# Patient Record
Sex: FEMALE | Race: WHITE | Employment: UNEMPLOYED | URBAN - METROPOLITAN AREA
[De-identification: names, ages, dates, MRNs, and addresses within clinical notes are randomized per-mention and may not be internally consistent; named-entity substitution may affect disease eponyms.]

---

## 2017-03-03 ENCOUNTER — TELEPHONE (OUTPATIENT)
Dept: FAMILY MEDICINE CLINIC | Age: 67
End: 2017-03-03

## 2017-03-08 ENCOUNTER — NURSE ONLY (OUTPATIENT)
Dept: FAMILY MEDICINE CLINIC | Age: 67
End: 2017-03-08

## 2017-03-08 ENCOUNTER — TELEPHONE (OUTPATIENT)
Dept: FAMILY MEDICINE CLINIC | Age: 67
End: 2017-03-08

## 2017-03-08 VITALS — DIASTOLIC BLOOD PRESSURE: 84 MMHG | SYSTOLIC BLOOD PRESSURE: 138 MMHG

## 2017-03-08 VITALS — SYSTOLIC BLOOD PRESSURE: 138 MMHG | DIASTOLIC BLOOD PRESSURE: 84 MMHG

## 2017-03-08 DIAGNOSIS — Z01.30 BLOOD PRESSURE CHECK: Primary | ICD-10-CM

## 2017-03-10 ENCOUNTER — OFFICE VISIT (OUTPATIENT)
Dept: FAMILY MEDICINE CLINIC | Age: 67
End: 2017-03-10

## 2017-03-10 VITALS
WEIGHT: 140.8 LBS | HEART RATE: 58 BPM | BODY MASS INDEX: 25.34 KG/M2 | TEMPERATURE: 98.6 F | DIASTOLIC BLOOD PRESSURE: 78 MMHG | OXYGEN SATURATION: 98 % | SYSTOLIC BLOOD PRESSURE: 122 MMHG

## 2017-03-10 DIAGNOSIS — R41.840 CONCENTRATION DEFICIT: ICD-10-CM

## 2017-03-10 DIAGNOSIS — R42 DISEQUILIBRIUM: Primary | ICD-10-CM

## 2017-03-10 DIAGNOSIS — M79.672 PAIN IN BOTH FEET: ICD-10-CM

## 2017-03-10 DIAGNOSIS — M79.671 PAIN IN BOTH FEET: ICD-10-CM

## 2017-03-10 PROCEDURE — 99214 OFFICE O/P EST MOD 30 MIN: CPT | Performed by: FAMILY MEDICINE

## 2017-03-10 PROCEDURE — 36415 COLL VENOUS BLD VENIPUNCTURE: CPT | Performed by: FAMILY MEDICINE

## 2017-03-11 LAB
A/G RATIO: 2.1 (CALC) (ref 0.8–2.6)
ALBUMIN SERPL-MCNC: 4.7 GM/DL (ref 3.5–5.2)
ALP BLD-CCNC: 116 U/L (ref 23–144)
ALT SERPL-CCNC: 10 U/L (ref 0–60)
AST SERPL-CCNC: 15 U/L (ref 0–46)
BASOPHILS ABSOLUTE: 0 K/MM3 (ref 0–0.3)
BASOPHILS RELATIVE PERCENT: 0.7 % (ref 0–2)
BILIRUB SERPL-MCNC: 0.5 MG/DL (ref 0–1.2)
BUN / CREAT RATIO: 24 (CALC) (ref 7–25)
BUN BLDV-MCNC: 17 MG/DL (ref 3–29)
CALCIUM SERPL-MCNC: 9.3 MG/DL (ref 8.5–10.5)
CHLORIDE BLD-SCNC: 100 MEQ/L (ref 96–110)
CO2: 26 MEQ/L (ref 19–32)
COPY(IES) SENT TO:: NORMAL
CREAT SERPL-MCNC: 0.7 MG/DL
EOSINOPHILS ABSOLUTE: 0 K/MM3 (ref 0–0.6)
EOSINOPHILS RELATIVE PERCENT: 0.7 % (ref 0–7)
GFR SERPL CREATININE-BSD FRML MDRD: 92 ML/MIN/1.73M2
GLOBULIN: 2.2 GM/DL (CALC) (ref 1.9–3.6)
GLUCOSE BLD-MCNC: 95 MG/DL
HCT VFR BLD CALC: 41.9 % (ref 35–46)
HEMOGLOBIN: 14 G/DL (ref 12–15.6)
LEUKOCYTES, UA: 6.5 K/MM3 (ref 3.8–10.8)
LYMPHOCYTES ABSOLUTE: 1.3 K/MM3 (ref 0.9–4.1)
LYMPHOCYTES RELATIVE PERCENT: 19.5 % (ref 18–47)
MCH RBC QN AUTO: 30.6 PG (ref 27–33)
MCHC RBC AUTO-ENTMCNC: 33.3 G/DL (ref 32–36)
MCV RBC AUTO: 91.8 FL (ref 80–100)
MONOCYTES ABSOLUTE: 0.4 K/MM3 (ref 0.2–1.1)
MONOCYTES RELATIVE PERCENT: 5.7 % (ref 0–14)
NEUTROPHILS ABSOLUTE: 4.8 K/MM3 (ref 1.5–7.8)
PDW BLD-RTO: 12.9 % (ref 9–15)
PLATELET # BLD: 268 K/MM3 (ref 130–400)
POTASSIUM SERPL-SCNC: 4 MEQ/L (ref 3.4–5.3)
RBC # BLD: 4.56 M/MM3 (ref 3.9–5.2)
SEGMENTED NEUTROPHILS RELATIVE PERCENT: 73.4 % (ref 40–75)
SODIUM BLD-SCNC: 141 MEQ/L (ref 135–148)
TOTAL PROTEIN: 6.9 GM/DL (ref 6–8.3)
TSH SERPL DL<=0.05 MIU/L-ACNC: 1.88 MICRO IU/ML (ref 0.4–4)

## 2017-05-10 ENCOUNTER — TELEPHONE (OUTPATIENT)
Dept: FAMILY MEDICINE CLINIC | Age: 67
End: 2017-05-10

## 2017-05-10 DIAGNOSIS — Z12.31 ENCOUNTER FOR SCREENING MAMMOGRAM FOR BREAST CANCER: Primary | ICD-10-CM

## 2017-05-18 ENCOUNTER — HOSPITAL ENCOUNTER (OUTPATIENT)
Dept: WOMENS IMAGING | Age: 67
Discharge: HOME | End: 2017-05-18
Attending: FAMILY MEDICINE | Admitting: FAMILY MEDICINE

## 2017-05-18 DIAGNOSIS — Z12.31 SCREENING MAMMOGRAM, ENCOUNTER FOR: ICD-10-CM

## 2017-05-23 DIAGNOSIS — R07.89 CHEST WALL PAIN: ICD-10-CM

## 2017-05-23 DIAGNOSIS — Z12.31 ENCOUNTER FOR SCREENING MAMMOGRAM FOR BREAST CANCER: ICD-10-CM

## 2017-11-30 ENCOUNTER — TELEPHONE (OUTPATIENT)
Dept: FAMILY MEDICINE CLINIC | Age: 67
End: 2017-11-30

## 2017-11-30 ENCOUNTER — NURSE ONLY (OUTPATIENT)
Dept: FAMILY MEDICINE CLINIC | Age: 67
End: 2017-11-30

## 2017-11-30 DIAGNOSIS — Z23 ENCOUNTER FOR IMMUNIZATION: Primary | ICD-10-CM

## 2017-11-30 PROCEDURE — G0009 ADMIN PNEUMOCOCCAL VACCINE: HCPCS | Performed by: FAMILY MEDICINE

## 2017-11-30 PROCEDURE — 90670 PCV13 VACCINE IM: CPT | Performed by: FAMILY MEDICINE

## 2017-12-15 ENCOUNTER — TELEPHONE (OUTPATIENT)
Dept: FAMILY MEDICINE CLINIC | Age: 67
End: 2017-12-15

## 2017-12-15 NOTE — TELEPHONE ENCOUNTER
Per Dr. Jazzmine Gomez' verbal order, [address] [facility] ED called to inform them that the patient was on her way to the ED with changed mental status and chest pain.

## 2018-01-09 ENCOUNTER — OFFICE VISIT (OUTPATIENT)
Dept: FAMILY MEDICINE CLINIC | Age: 68
End: 2018-01-09

## 2018-01-09 VITALS
DIASTOLIC BLOOD PRESSURE: 80 MMHG | OXYGEN SATURATION: 97 % | TEMPERATURE: 99.2 F | BODY MASS INDEX: 24.06 KG/M2 | HEIGHT: 63 IN | HEART RATE: 82 BPM | SYSTOLIC BLOOD PRESSURE: 140 MMHG | WEIGHT: 135.8 LBS

## 2018-01-09 DIAGNOSIS — Z11.59 ENCOUNTER FOR HEPATITIS C SCREENING TEST FOR LOW RISK PATIENT: ICD-10-CM

## 2018-01-09 DIAGNOSIS — F03.90 DEMENTIA WITHOUT BEHAVIORAL DISTURBANCE, UNSPECIFIED DEMENTIA TYPE: ICD-10-CM

## 2018-01-09 DIAGNOSIS — Z11.4 SCREENING FOR HIV (HUMAN IMMUNODEFICIENCY VIRUS): ICD-10-CM

## 2018-01-09 DIAGNOSIS — R07.89 CHEST PAIN, MUSCULOSKELETAL: ICD-10-CM

## 2018-01-09 DIAGNOSIS — J01.00 ACUTE NON-RECURRENT MAXILLARY SINUSITIS: Primary | ICD-10-CM

## 2018-01-09 PROCEDURE — 3017F COLORECTAL CA SCREEN DOC REV: CPT | Performed by: FAMILY MEDICINE

## 2018-01-09 PROCEDURE — G8484 FLU IMMUNIZE NO ADMIN: HCPCS | Performed by: FAMILY MEDICINE

## 2018-01-09 PROCEDURE — 4040F PNEUMOC VAC/ADMIN/RCVD: CPT | Performed by: FAMILY MEDICINE

## 2018-01-09 PROCEDURE — 99214 OFFICE O/P EST MOD 30 MIN: CPT | Performed by: FAMILY MEDICINE

## 2018-01-09 PROCEDURE — 1090F PRES/ABSN URINE INCON ASSESS: CPT | Performed by: FAMILY MEDICINE

## 2018-01-09 PROCEDURE — G8420 CALC BMI NORM PARAMETERS: HCPCS | Performed by: FAMILY MEDICINE

## 2018-01-09 PROCEDURE — 1036F TOBACCO NON-USER: CPT | Performed by: FAMILY MEDICINE

## 2018-01-09 PROCEDURE — G8427 DOCREV CUR MEDS BY ELIG CLIN: HCPCS | Performed by: FAMILY MEDICINE

## 2018-01-09 PROCEDURE — G8400 PT W/DXA NO RESULTS DOC: HCPCS | Performed by: FAMILY MEDICINE

## 2018-01-09 PROCEDURE — 3014F SCREEN MAMMO DOC REV: CPT | Performed by: FAMILY MEDICINE

## 2018-01-09 PROCEDURE — 1123F ACP DISCUSS/DSCN MKR DOCD: CPT | Performed by: FAMILY MEDICINE

## 2018-01-09 RX ORDER — DONEPEZIL HYDROCHLORIDE 10 MG/1
10 TABLET, FILM COATED ORAL NIGHTLY
Qty: 30 TABLET | Refills: 5 | Status: SHIPPED | OUTPATIENT
Start: 2018-01-09 | End: 2018-09-25 | Stop reason: SDUPTHER

## 2018-01-09 RX ORDER — CIPROFLOXACIN 250 MG/1
250 TABLET, FILM COATED ORAL 2 TIMES DAILY
Qty: 20 TABLET | Refills: 0 | Status: SHIPPED | OUTPATIENT
Start: 2018-01-09 | End: 2018-01-19

## 2018-01-09 ASSESSMENT — PATIENT HEALTH QUESTIONNAIRE - PHQ9
1. LITTLE INTEREST OR PLEASURE IN DOING THINGS: 0
2. FEELING DOWN, DEPRESSED OR HOPELESS: 0
SUM OF ALL RESPONSES TO PHQ QUESTIONS 1-9: 0
SUM OF ALL RESPONSES TO PHQ9 QUESTIONS 1 & 2: 0

## 2018-01-10 LAB
COPY(IES) SENT TO:: NORMAL
HEPATITIS C ANTIBODY: NEGATIVE
HIV AG/AB: NORMAL

## 2018-02-08 ENCOUNTER — OFFICE VISIT (OUTPATIENT)
Dept: FAMILY MEDICINE CLINIC | Age: 68
End: 2018-02-08

## 2018-02-08 VITALS
DIASTOLIC BLOOD PRESSURE: 70 MMHG | RESPIRATION RATE: 14 BRPM | TEMPERATURE: 99.3 F | HEIGHT: 62 IN | SYSTOLIC BLOOD PRESSURE: 110 MMHG | HEART RATE: 71 BPM | WEIGHT: 131 LBS | BODY MASS INDEX: 24.11 KG/M2 | OXYGEN SATURATION: 97 %

## 2018-02-08 DIAGNOSIS — S76.312A HAMSTRING MUSCLE STRAIN, LEFT, INITIAL ENCOUNTER: Primary | ICD-10-CM

## 2018-02-08 PROCEDURE — 3014F SCREEN MAMMO DOC REV: CPT | Performed by: FAMILY MEDICINE

## 2018-02-08 PROCEDURE — G8400 PT W/DXA NO RESULTS DOC: HCPCS | Performed by: FAMILY MEDICINE

## 2018-02-08 PROCEDURE — 4040F PNEUMOC VAC/ADMIN/RCVD: CPT | Performed by: FAMILY MEDICINE

## 2018-02-08 PROCEDURE — 1090F PRES/ABSN URINE INCON ASSESS: CPT | Performed by: FAMILY MEDICINE

## 2018-02-08 PROCEDURE — G8420 CALC BMI NORM PARAMETERS: HCPCS | Performed by: FAMILY MEDICINE

## 2018-02-08 PROCEDURE — G8427 DOCREV CUR MEDS BY ELIG CLIN: HCPCS | Performed by: FAMILY MEDICINE

## 2018-02-08 PROCEDURE — 1123F ACP DISCUSS/DSCN MKR DOCD: CPT | Performed by: FAMILY MEDICINE

## 2018-02-08 PROCEDURE — 1036F TOBACCO NON-USER: CPT | Performed by: FAMILY MEDICINE

## 2018-02-08 PROCEDURE — G8484 FLU IMMUNIZE NO ADMIN: HCPCS | Performed by: FAMILY MEDICINE

## 2018-02-08 PROCEDURE — 3017F COLORECTAL CA SCREEN DOC REV: CPT | Performed by: FAMILY MEDICINE

## 2018-02-08 PROCEDURE — 99213 OFFICE O/P EST LOW 20 MIN: CPT | Performed by: FAMILY MEDICINE

## 2018-02-08 RX ORDER — NAPROXEN 500 MG/1
500 TABLET ORAL 2 TIMES DAILY WITH MEALS
Qty: 60 TABLET | Refills: 1 | Status: SHIPPED | OUTPATIENT
Start: 2018-02-08 | End: 2018-09-25

## 2018-02-08 RX ORDER — BACLOFEN 10 MG/1
10 TABLET ORAL 3 TIMES DAILY
Qty: 90 TABLET | Refills: 0 | Status: SHIPPED | OUTPATIENT
Start: 2018-02-08 | End: 2018-09-25

## 2018-02-08 NOTE — PATIENT INSTRUCTIONS
position. 4. Repeat 8 to 12 times. 5. When you can do this exercise with ease and no pain, add some resistance. To do this:  6. Tie the ends of an exercise band together to form a loop. Attach one end of the loop to a secure object or shut a door on it to hold it in place. (Or you can have someone hold one end of the loop to provide resistance.)  7. Loop the other end of the exercise band around the lower part of your affected leg. 8. Repeat steps 1 through 4, slowly pulling back on the exercise band with your leg. Hamstring wall stretch    1. Lie on your back in a doorway, with your good leg through the open door. 2. Slide your affected leg up the wall to straighten your knee. You should feel a gentle stretch down the back of your leg. 1. Do not arch your back. 2. Do not bend either knee. 3. Keep one heel touching the floor and the other heel touching the wall. Do not point your toes. 3. Hold the stretch for at least 1 minute to begin. Then try to lengthen the time you hold the stretch to as long as 6 minutes. 4. Repeat 2 to 4 times. 5. If you do not have a place to do this exercise in a doorway, there is another way to do it:  6. Lie on your back, and bend the knee of your affected leg. 7. Loop a towel under the ball and toes of that foot, and hold the ends of the towel in your hands. 8. Straighten your knee, and slowly pull back on the towel. You should feel a gentle stretch down the back of your leg. 9. Hold the stretch for 15 to 30 seconds. Or even better, hold the stretch for 1 minute if you can. 10. Repeat 2 to 4 times. Calf stretch    1. Stand facing a wall with your hands on the wall at about eye level. Put your affected leg about a step behind your other leg. 2. Keeping your back leg straight and your back heel on the floor, bend your front knee and gently bring your hip and chest toward the wall until you feel a stretch in the calf of your back leg.   3. Hold the stretch for 15 to 30

## 2018-02-22 ENCOUNTER — OFFICE VISIT (OUTPATIENT)
Dept: FAMILY MEDICINE CLINIC | Age: 68
End: 2018-02-22

## 2018-02-22 ENCOUNTER — HOSPITAL ENCOUNTER (OUTPATIENT)
Dept: GENERAL RADIOLOGY | Age: 68
Discharge: HOME | End: 2018-02-22
Attending: FAMILY MEDICINE | Admitting: FAMILY MEDICINE

## 2018-02-22 VITALS
DIASTOLIC BLOOD PRESSURE: 82 MMHG | OXYGEN SATURATION: 95 % | SYSTOLIC BLOOD PRESSURE: 132 MMHG | HEART RATE: 95 BPM | WEIGHT: 129.4 LBS | BODY MASS INDEX: 23.67 KG/M2 | TEMPERATURE: 97.4 F

## 2018-02-22 DIAGNOSIS — R53.82 CHRONIC FATIGUE: ICD-10-CM

## 2018-02-22 DIAGNOSIS — M54.16 LUMBAR RADICULOPATHY: ICD-10-CM

## 2018-02-22 DIAGNOSIS — M54.16 LUMBAR RADICULOPATHY: Primary | ICD-10-CM

## 2018-02-22 DIAGNOSIS — J30.89 CHRONIC NON-SEASONAL ALLERGIC RHINITIS, UNSPECIFIED TRIGGER: ICD-10-CM

## 2018-02-22 DIAGNOSIS — F51.04 PSYCHOPHYSIOLOGICAL INSOMNIA: ICD-10-CM

## 2018-02-22 PROCEDURE — G8427 DOCREV CUR MEDS BY ELIG CLIN: HCPCS | Performed by: FAMILY MEDICINE

## 2018-02-22 PROCEDURE — G8484 FLU IMMUNIZE NO ADMIN: HCPCS | Performed by: FAMILY MEDICINE

## 2018-02-22 PROCEDURE — 3014F SCREEN MAMMO DOC REV: CPT | Performed by: FAMILY MEDICINE

## 2018-02-22 PROCEDURE — 1123F ACP DISCUSS/DSCN MKR DOCD: CPT | Performed by: FAMILY MEDICINE

## 2018-02-22 PROCEDURE — G8420 CALC BMI NORM PARAMETERS: HCPCS | Performed by: FAMILY MEDICINE

## 2018-02-22 PROCEDURE — 1036F TOBACCO NON-USER: CPT | Performed by: FAMILY MEDICINE

## 2018-02-22 PROCEDURE — 99214 OFFICE O/P EST MOD 30 MIN: CPT | Performed by: FAMILY MEDICINE

## 2018-02-22 PROCEDURE — 4040F PNEUMOC VAC/ADMIN/RCVD: CPT | Performed by: FAMILY MEDICINE

## 2018-02-22 PROCEDURE — G8400 PT W/DXA NO RESULTS DOC: HCPCS | Performed by: FAMILY MEDICINE

## 2018-02-22 PROCEDURE — 3017F COLORECTAL CA SCREEN DOC REV: CPT | Performed by: FAMILY MEDICINE

## 2018-02-22 PROCEDURE — 1090F PRES/ABSN URINE INCON ASSESS: CPT | Performed by: FAMILY MEDICINE

## 2018-02-22 RX ORDER — TRAZODONE HYDROCHLORIDE 50 MG/1
50 TABLET ORAL NIGHTLY
Qty: 30 TABLET | Refills: 5 | Status: SHIPPED | OUTPATIENT
Start: 2018-02-22 | End: 2018-09-25 | Stop reason: SDUPTHER

## 2018-02-22 RX ORDER — FLUTICASONE PROPIONATE 50 MCG
2 SPRAY, SUSPENSION (ML) NASAL DAILY
Qty: 1 BOTTLE | Refills: 5 | Status: SHIPPED | OUTPATIENT
Start: 2018-02-22 | End: 2018-09-25 | Stop reason: SDUPTHER

## 2018-02-22 NOTE — PATIENT INSTRUCTIONS
Patient Education        Allergies: Care Instructions  Your Care Instructions    Allergies occur when your body's defense system (immune system) overreacts to certain substances. The immune system treats a harmless substance as if it were a harmful germ or virus. Many things can cause this overreaction, including pollens, medicine, food, dust, animal dander, and mold. Allergies can be mild or severe. Mild allergies can be managed with home treatment. But medicine may be needed to prevent problems. Managing your allergies is an important part of staying healthy. Your doctor may suggest that you have allergy testing to help find out what is causing your allergies. When you know what things trigger your symptoms, you can avoid them. This can prevent allergy symptoms and other health problems. For severe allergies that cause reactions that affect your whole body (anaphylactic reactions), your doctor may prescribe a shot of epinephrine to carry with you in case you have a severe reaction. Learn how to give yourself the shot and keep it with you at all times. Make sure it is not . Follow-up care is a key part of your treatment and safety. Be sure to make and go to all appointments, and call your doctor if you are having problems. It's also a good idea to know your test results and keep a list of the medicines you take. How can you care for yourself at home? · If you have been told by your doctor that dust or dust mites are causing your allergy, decrease the dust around your bed:  [facility] sheets, pillowcases, and other bedding in hot water every week. ¨ Use dust-proof covers for pillows, duvets, and mattresses. Avoid plastic covers because they tear easily and do not \"breathe. \" Wash as instructed on the label. ¨ Do not use any blankets and pillows that you do not need. ¨ Use blankets that you can wash in your washing machine.   ¨ Consider removing drapes and carpets, which attract and hold dust, from your

## 2018-02-22 NOTE — PROGRESS NOTES
132/82 (Site: Left Arm, Position: Sitting, Cuff Size: Medium Adult)   Pulse 95   Temp 97.4 °F (36.3 °C) (Temporal)   Wt 129 lb 6.4 oz (58.7 kg)   SpO2 95%   BMI 23.67 kg/m²    Patient appears to be in mild to moderate pain, antalgic gait noted. HEENT: AT/NC, PERRLA, EOMI, O/P clear. Nasal mucosa pale. Eyes watery. Lumbosacral spine area reveals no local tenderness or mass. Painful and reduced LS ROM noted. Straight leg raise is negative at 90 degrees on bilateral. DTR's, motor strength and sensation normal, including heel and toe gait. Peripheral pulses are palpable. X-Ray: ordered, but results not yet available. ASSESSMENT:   1. Lumbar radiculopathy  XR LUMBAR SPINE (2-3 VIEWS)    [facility]   2. Chronic fatigue  traZODone (DESYREL) 50 MG tablet   3. Psychophysiological insomnia  traZODone (DESYREL) 50 MG tablet   4. Chronic non-seasonal allergic rhinitis, unspecified trigger  fluticasone (FLONASE) 50 MCG/ACT nasal spray       PLAN:  For acute pain, rest, intermittent application of heat (do not sleep on heating pad), analgesics and muscle relaxants are recommended. Discussed longer term treatment plan of prn NSAID's and discussed a home back care exercise program with flexion exercise routine. Proper lifting with avoidance of heavy lifting discussed. Consider Physical Therapy and XRay studies if not improving. Call or return to clinic prn if these symptoms worsen or fail to improve as anticipated.

## 2018-02-26 ENCOUNTER — HOSPITAL ENCOUNTER (OUTPATIENT)
Dept: OTHER | Age: 68
Discharge: HOME | End: 2018-02-28
Attending: FAMILY MEDICINE | Admitting: FAMILY MEDICINE

## 2018-03-01 ENCOUNTER — HOSPITAL ENCOUNTER (OUTPATIENT)
Dept: OTHER | Age: 68
Discharge: HOME | End: 2018-03-31
Attending: FAMILY MEDICINE | Admitting: FAMILY MEDICINE

## 2018-04-01 ENCOUNTER — HOSPITAL ENCOUNTER (OUTPATIENT)
Dept: OTHER | Age: 68
Discharge: HOME | End: 2018-04-23
Attending: FAMILY MEDICINE | Admitting: FAMILY MEDICINE

## 2018-09-25 ENCOUNTER — TELEPHONE (OUTPATIENT)
Dept: FAMILY MEDICINE CLINIC | Age: 68
End: 2018-09-25

## 2018-09-25 ENCOUNTER — OFFICE VISIT (OUTPATIENT)
Dept: FAMILY MEDICINE CLINIC | Age: 68
End: 2018-09-25
Payer: MEDICARE

## 2018-09-25 VITALS
BODY MASS INDEX: 24.44 KG/M2 | OXYGEN SATURATION: 97 % | DIASTOLIC BLOOD PRESSURE: 80 MMHG | HEART RATE: 90 BPM | SYSTOLIC BLOOD PRESSURE: 130 MMHG | WEIGHT: 133.6 LBS | TEMPERATURE: 97.9 F

## 2018-09-25 DIAGNOSIS — R53.82 CHRONIC FATIGUE: ICD-10-CM

## 2018-09-25 DIAGNOSIS — F51.04 PSYCHOPHYSIOLOGICAL INSOMNIA: ICD-10-CM

## 2018-09-25 DIAGNOSIS — F03.90 DEMENTIA WITHOUT BEHAVIORAL DISTURBANCE, UNSPECIFIED DEMENTIA TYPE: ICD-10-CM

## 2018-09-25 DIAGNOSIS — M25.50 ARTHRALGIA, UNSPECIFIED JOINT: ICD-10-CM

## 2018-09-25 DIAGNOSIS — Z13.1 SCREENING FOR DIABETES MELLITUS: ICD-10-CM

## 2018-09-25 DIAGNOSIS — M79.671 PAIN IN BOTH FEET: ICD-10-CM

## 2018-09-25 DIAGNOSIS — Z13.6 SCREENING FOR CARDIOVASCULAR CONDITION: ICD-10-CM

## 2018-09-25 DIAGNOSIS — R53.82 CHRONIC FATIGUE: Primary | ICD-10-CM

## 2018-09-25 DIAGNOSIS — Z12.11 COLON CANCER SCREENING: ICD-10-CM

## 2018-09-25 DIAGNOSIS — M79.672 PAIN IN BOTH FEET: ICD-10-CM

## 2018-09-25 DIAGNOSIS — N30.00 ACUTE CYSTITIS WITHOUT HEMATURIA: ICD-10-CM

## 2018-09-25 DIAGNOSIS — Z12.39 BREAST CANCER SCREENING: ICD-10-CM

## 2018-09-25 DIAGNOSIS — F03.90 DEMENTIA WITHOUT BEHAVIORAL DISTURBANCE, UNSPECIFIED DEMENTIA TYPE: Primary | ICD-10-CM

## 2018-09-25 LAB
A/G RATIO: 1.9 (CALC) (ref 0.8–2.6)
ALBUMIN SERPL-MCNC: 4.5 GM/DL (ref 3.5–5.2)
ALP BLD-CCNC: 98 U/L (ref 23–144)
ALT SERPL-CCNC: 8 U/L (ref 0–60)
AST SERPL-CCNC: 16 U/L (ref 0–55)
BILIRUB SERPL-MCNC: 0.4 MG/DL (ref 0–1.2)
BILIRUBIN, POC: ABNORMAL
BLOOD URINE, POC: ABNORMAL
BUN / CREAT RATIO: 13 (CALC) (ref 7–25)
BUN BLDV-MCNC: 10 MG/DL (ref 3–29)
CALCIUM SERPL-MCNC: 9.8 MG/DL (ref 8.5–10.5)
CHLORIDE BLD-SCNC: 104 MEQ/L (ref 96–110)
CHOLESTEROL, TOTAL: 189 MG/DL
CLARITY, POC: ABNORMAL
CO2: 26 MEQ/L (ref 19–32)
COLOR, POC: ABNORMAL
COPY(IES) SENT TO:: NORMAL
CREAT SERPL-MCNC: 0.8 MG/DL
GFR SERPL CREATININE-BSD FRML MDRD: 77 ML/MIN/1.73M2
GLOBULIN: 2.4 GM/DL (CALC) (ref 1.9–3.6)
GLUCOSE BLD-MCNC: 103 MG/DL
GLUCOSE URINE, POC: ABNORMAL
HDLC SERPL-MCNC: 58 MG/DL
KETONES, POC: ABNORMAL
LDL CHOLESTEROL: 108 MG/DL (CALC)
LEUKOCYTE EST, POC: ABNORMAL
NITRITE, POC: NEGATIVE
PH, POC: 7
POTASSIUM SERPL-SCNC: 4.3 MEQ/L (ref 3.4–5.3)
PROTEIN, POC: 30
SODIUM BLD-SCNC: 142 MEQ/L (ref 135–148)
SPECIFIC GRAVITY, POC: 1.02
TOTAL PROTEIN: 6.9 GM/DL (ref 6–8.3)
TRIGL SERPL-MCNC: 113 MG/DL
UROBILINOGEN, POC: 0.2
VLDLC SERPL CALC-MCNC: 23 MG/DL (CALC) (ref 4–38)

## 2018-09-25 PROCEDURE — 3017F COLORECTAL CA SCREEN DOC REV: CPT | Performed by: FAMILY MEDICINE

## 2018-09-25 PROCEDURE — 90662 IIV NO PRSV INCREASED AG IM: CPT | Performed by: FAMILY MEDICINE

## 2018-09-25 PROCEDURE — 36415 COLL VENOUS BLD VENIPUNCTURE: CPT | Performed by: FAMILY MEDICINE

## 2018-09-25 PROCEDURE — 4040F PNEUMOC VAC/ADMIN/RCVD: CPT | Performed by: FAMILY MEDICINE

## 2018-09-25 PROCEDURE — 1090F PRES/ABSN URINE INCON ASSESS: CPT | Performed by: FAMILY MEDICINE

## 2018-09-25 PROCEDURE — G8400 PT W/DXA NO RESULTS DOC: HCPCS | Performed by: FAMILY MEDICINE

## 2018-09-25 PROCEDURE — 99214 OFFICE O/P EST MOD 30 MIN: CPT | Performed by: FAMILY MEDICINE

## 2018-09-25 PROCEDURE — 1123F ACP DISCUSS/DSCN MKR DOCD: CPT | Performed by: FAMILY MEDICINE

## 2018-09-25 PROCEDURE — G0008 ADMIN INFLUENZA VIRUS VAC: HCPCS | Performed by: FAMILY MEDICINE

## 2018-09-25 PROCEDURE — G8428 CUR MEDS NOT DOCUMENT: HCPCS | Performed by: FAMILY MEDICINE

## 2018-09-25 PROCEDURE — 1036F TOBACCO NON-USER: CPT | Performed by: FAMILY MEDICINE

## 2018-09-25 PROCEDURE — 81002 URINALYSIS NONAUTO W/O SCOPE: CPT | Performed by: FAMILY MEDICINE

## 2018-09-25 PROCEDURE — 1101F PT FALLS ASSESS-DOCD LE1/YR: CPT | Performed by: FAMILY MEDICINE

## 2018-09-25 PROCEDURE — G8420 CALC BMI NORM PARAMETERS: HCPCS | Performed by: FAMILY MEDICINE

## 2018-09-25 RX ORDER — FLUTICASONE PROPIONATE 50 MCG
2 SPRAY, SUSPENSION (ML) NASAL DAILY
Qty: 1 BOTTLE | Refills: 5 | Status: SHIPPED | OUTPATIENT
Start: 2018-09-25 | End: 2019-10-24

## 2018-09-25 RX ORDER — DONEPEZIL HYDROCHLORIDE 10 MG/1
10 TABLET, FILM COATED ORAL NIGHTLY
Qty: 30 TABLET | Refills: 5 | Status: SHIPPED | OUTPATIENT
Start: 2018-09-25 | End: 2019-02-19 | Stop reason: ALTCHOICE

## 2018-09-25 RX ORDER — TRAZODONE HYDROCHLORIDE 50 MG/1
50 TABLET ORAL NIGHTLY
Qty: 30 TABLET | Refills: 5 | Status: SHIPPED | OUTPATIENT
Start: 2018-09-25 | End: 2019-10-24 | Stop reason: SDUPTHER

## 2018-09-25 RX ORDER — CIPROFLOXACIN 250 MG/1
250 TABLET, FILM COATED ORAL 2 TIMES DAILY
Qty: 10 TABLET | Refills: 0 | Status: SHIPPED | OUTPATIENT
Start: 2018-09-25 | End: 2019-02-19 | Stop reason: SDUPTHER

## 2018-09-25 RX ORDER — PHENAZOPYRIDINE HYDROCHLORIDE 200 MG/1
200 TABLET, FILM COATED ORAL 3 TIMES DAILY PRN
Qty: 9 TABLET | Refills: 0 | Status: SHIPPED | OUTPATIENT
Start: 2018-09-25 | End: 2019-02-19 | Stop reason: SDUPTHER

## 2018-09-25 ASSESSMENT — PATIENT HEALTH QUESTIONNAIRE - PHQ9
SUM OF ALL RESPONSES TO PHQ QUESTIONS 1-9: 0
SUM OF ALL RESPONSES TO PHQ9 QUESTIONS 1 & 2: 0
SUM OF ALL RESPONSES TO PHQ QUESTIONS 1-9: 0
2. FEELING DOWN, DEPRESSED OR HOPELESS: 0
1. LITTLE INTEREST OR PLEASURE IN DOING THINGS: 0

## 2018-09-25 NOTE — PROGRESS NOTES
Vaccine Information Sheet, \"Influenza - Inactivated\"  given to Michael Pereira, or parent/legal guardian of  Michael Pereira and verbalized understanding. Patient responses:    Have you ever had a reaction to a flu vaccine? No  Are you able to eat eggs without adverse effects? Yes  Do you have any current illness? No  Have you ever had [place]illian [place] Syndrome? No    Flu vaccine given per order. Please see immunization tab.

## 2018-09-25 NOTE — PATIENT INSTRUCTIONS
sleeping:  ¨ Try not to nap too close to your bedtime. ¨ Exercise regularly. Walking is a good choice. ¨ Try a glass of warm milk or caffeine-free herbal tea before bed. · Do tasks and activities during the time of day when you feel your best. It may help to develop a daily routine. · Post labels, lists, and sticky notes to help you remember things. Write your activities on a calendar you can easily find. Put your clock where you can easily see it. · Stay active. Take walks in familiar places, or with friends or loved ones. Try to stay active mentally too. Read and work crossword puzzles if you enjoy these activities. · Do not drive unless you can pass an on-road driving test. If you are not sure if you are safe to drive, your state 's license bureau can test you. · Keep a cordless phone and a flashlight with new batteries by your bed. If possible, put a phone in each of the main rooms of your house, or carry a cell phone in case you fall and cannot reach a phone. Or, you can wear a device around your neck or wrist. You push a button that sends a signal for help. Acknowledge your emotions and plan for the future  · Talk openly and honestly with your doctor. · Let yourself grieve. It is common to feel angry, scared, frustrated, anxious, or depressed. · Get emotional support from family, friends, a support group, or a counselor experienced in working with people who have dementia. · Ask for help if you need it. · Plan for the future. ¨ Talk to your family and doctor about preparing a living will and other important papers while you can make decisions. These papers tell your doctors how to care for you at the end of your life. ¨ Consider naming a person to make decisions about your care if you are not able to. When should you call for help? Call 911 anytime you think you may need emergency care.  For example, call if:    · You are lost and do not know whom to call.     · You are injured and do not

## 2018-09-25 NOTE — PROGRESS NOTES
Joint/Muscle Pain: Patient complains of arthralgias for which has been present for several years. Pain is located in multiple joints, is described as aching, and is intermittent . Associated symptoms include: crepitation. The patient has tried NSAIDs for pain, with moderate relief. Related to injury:   no. The patient complains of insomnia. Onset was several years ago. Patient describes symptoms as difficulty falling asleep. Patient has found moderate relief with going to sleep at the same time each night, regular daily exercise and prescription sleep aid- trazodone- 50 mg. Associated symptoms include: fatigue. Patient denies irritability, leg cramps, restless legs, snoring and stress. Symptoms have progressed to a point and plateaued. She was mistakenly taking her trazodone in the morning. Dementia: She is here for evaluation and treatment of cognitive problems. Primary caregiver is patient. The family and the patient identify problems with changes in short term memory. Family and patient report problems with remembering dates, days of the week, tasks. Family and patient are not concerned about  medication errors, wandering, driving, cooking and inability to maintain adequate nutrition, however. Medication administration: patient self medicates  . Functional Assessment:   Activities of Daily Living (ADLs):    She is independent in the following: ambulation, bathing and hygiene, feeding, continence, grooming, toileting and dressing  Requires assistance with the following: none    ROS: No TIA's or dysphagia. No prolonged cough. No dyspnea or chest pain on exertion. No abdominal pain, change in bowel habits, black or bloody stools. No urinary tract symptoms. She is post hysterectomy. No abnormal vaginal bleeding, discharge or unexpected pelvic pain. No new breast lumps, breast pain or nipple discharge.     Past Medical History:   Diagnosis Date    HTN (hypertension)      Past Surgical History:   Procedure disturbance, unspecified dementia type  donepezil (ARICEPT) 10 MG tablet   2. Chronic fatigue  traZODone (DESYREL) 50 MG tablet   3. Psychophysiological insomnia  traZODone (DESYREL) 50 MG tablet   4. Pain in both feet  diclofenac (VOLTAREN) 50 MG EC tablet   5. Arthralgia, unspecified joint     6. Colon cancer screening  External Referral To Gastroenterology   7. Breast cancer screening  JOSE DAVID DIGITAL SCREEN W CAD BILATERAL   8. Screening for cardiovascular condition  Lipid Panel   9. Screening for diabetes mellitus  Comprehensive Metabolic Panel            Plan:       per orders  Symptomatic therapy also suggested: push fluids, rest and ROV prn if symptoms persist or worsen. Call or return to office prn if these symptoms worsen or fail to improve.

## 2018-09-25 NOTE — TELEPHONE ENCOUNTER
The diclofenac should help with the back pain. What kind of symptoms does she have that she thinks she has a yeast infection?

## 2018-09-25 NOTE — TELEPHONE ENCOUNTER
Urine is \"so hot it could burn me\" and it \"stinks\" Denies burning or itching but pt states she has had more frequency

## 2018-11-09 ENCOUNTER — OFFICE VISIT (OUTPATIENT)
Dept: FAMILY MEDICINE CLINIC | Age: 68
End: 2018-11-09
Payer: MEDICARE

## 2018-11-09 VITALS
OXYGEN SATURATION: 98 % | WEIGHT: 127.4 LBS | TEMPERATURE: 97.8 F | HEART RATE: 85 BPM | DIASTOLIC BLOOD PRESSURE: 70 MMHG | SYSTOLIC BLOOD PRESSURE: 132 MMHG | BODY MASS INDEX: 23.3 KG/M2

## 2018-11-09 DIAGNOSIS — R53.82 CHRONIC FATIGUE: ICD-10-CM

## 2018-11-09 DIAGNOSIS — R25.2 BILATERAL LEG CRAMPS: Primary | ICD-10-CM

## 2018-11-09 PROCEDURE — 99214 OFFICE O/P EST MOD 30 MIN: CPT | Performed by: FAMILY MEDICINE

## 2018-11-09 PROCEDURE — 1101F PT FALLS ASSESS-DOCD LE1/YR: CPT | Performed by: FAMILY MEDICINE

## 2018-11-09 PROCEDURE — 1090F PRES/ABSN URINE INCON ASSESS: CPT | Performed by: FAMILY MEDICINE

## 2018-11-09 PROCEDURE — G8420 CALC BMI NORM PARAMETERS: HCPCS | Performed by: FAMILY MEDICINE

## 2018-11-09 PROCEDURE — 36415 COLL VENOUS BLD VENIPUNCTURE: CPT | Performed by: FAMILY MEDICINE

## 2018-11-09 PROCEDURE — 3017F COLORECTAL CA SCREEN DOC REV: CPT | Performed by: FAMILY MEDICINE

## 2018-11-09 PROCEDURE — 1123F ACP DISCUSS/DSCN MKR DOCD: CPT | Performed by: FAMILY MEDICINE

## 2018-11-09 PROCEDURE — 1036F TOBACCO NON-USER: CPT | Performed by: FAMILY MEDICINE

## 2018-11-09 PROCEDURE — 4040F PNEUMOC VAC/ADMIN/RCVD: CPT | Performed by: FAMILY MEDICINE

## 2018-11-09 PROCEDURE — G8400 PT W/DXA NO RESULTS DOC: HCPCS | Performed by: FAMILY MEDICINE

## 2018-11-09 PROCEDURE — G8428 CUR MEDS NOT DOCUMENT: HCPCS | Performed by: FAMILY MEDICINE

## 2018-11-09 PROCEDURE — G8482 FLU IMMUNIZE ORDER/ADMIN: HCPCS | Performed by: FAMILY MEDICINE

## 2018-11-09 RX ORDER — BACLOFEN 10 MG/1
10 TABLET ORAL 3 TIMES DAILY
Qty: 90 TABLET | Refills: 1 | Status: SHIPPED | OUTPATIENT
Start: 2018-11-09 | End: 2019-10-24

## 2018-11-09 ASSESSMENT — PATIENT HEALTH QUESTIONNAIRE - PHQ9
SUM OF ALL RESPONSES TO PHQ QUESTIONS 1-9: 0
SUM OF ALL RESPONSES TO PHQ9 QUESTIONS 1 & 2: 0
2. FEELING DOWN, DEPRESSED OR HOPELESS: 0
SUM OF ALL RESPONSES TO PHQ QUESTIONS 1-9: 0
1. LITTLE INTEREST OR PLEASURE IN DOING THINGS: 0

## 2018-11-10 LAB
BASOPHILS ABSOLUTE: 0 K/MM3 (ref 0–0.3)
BASOPHILS RELATIVE PERCENT: 0.6 % (ref 0–2)
BUN / CREAT RATIO: 14 (CALC) (ref 7–25)
BUN BLDV-MCNC: 11 MG/DL (ref 3–29)
CALCIUM SERPL-MCNC: 9.7 MG/DL (ref 8.5–10.5)
CHLORIDE BLD-SCNC: 102 MEQ/L (ref 96–110)
CO2: 28 MEQ/L (ref 19–32)
COPY(IES) SENT TO:: NORMAL
CREAT SERPL-MCNC: 0.8 MG/DL
EOSINOPHILS ABSOLUTE: 0.1 K/MM3 (ref 0–0.6)
EOSINOPHILS RELATIVE PERCENT: 1.3 % (ref 0–7)
GFR SERPL CREATININE-BSD FRML MDRD: 77 ML/MIN/1.73M2
GLUCOSE BLD-MCNC: 124 MG/DL
HCT VFR BLD CALC: 40.5 % (ref 35–46)
HEMOGLOBIN: 14 G/DL (ref 12–15.6)
LEUKOCYTES, UA: 7 K/MM3 (ref 3.8–10.8)
LYMPHOCYTES ABSOLUTE: 1.2 K/MM3 (ref 0.9–4.1)
LYMPHOCYTES RELATIVE PERCENT: 17.6 % (ref 14–51)
MCH RBC QN AUTO: 31 PG (ref 27–33)
MCHC RBC AUTO-ENTMCNC: 34.5 G/DL (ref 32–36)
MCV RBC AUTO: 90.1 FL (ref 80–100)
MONOCYTES ABSOLUTE: 0.4 K/MM3 (ref 0.2–1.1)
MONOCYTES RELATIVE PERCENT: 5.9 % (ref 0–14)
NEUTROPHILS ABSOLUTE: 5.2 K/MM3 (ref 1.5–7.8)
PDW BLD-RTO: 12.4 % (ref 9–15)
PLATELET # BLD: 301 K/MM3 (ref 130–400)
POTASSIUM SERPL-SCNC: 3.6 MEQ/L (ref 3.4–5.3)
RBC # BLD: 4.5 M/MM3 (ref 3.9–5.2)
SEGMENTED NEUTROPHILS RELATIVE PERCENT: 74.6 % (ref 40–76)
SODIUM BLD-SCNC: 142 MEQ/L (ref 135–148)
TSH SERPL DL<=0.05 MIU/L-ACNC: 0.79 MICRO IU/ML (ref 0.4–4.5)
VITAMIN B-12: 1356 PG/ML (ref 200–1100)

## 2018-11-16 ENCOUNTER — OFFICE VISIT (OUTPATIENT)
Dept: FAMILY MEDICINE CLINIC | Age: 68
End: 2018-11-16
Payer: MEDICARE

## 2018-11-16 VITALS
WEIGHT: 127.4 LBS | DIASTOLIC BLOOD PRESSURE: 66 MMHG | OXYGEN SATURATION: 97 % | HEART RATE: 93 BPM | TEMPERATURE: 97.5 F | BODY MASS INDEX: 23.3 KG/M2 | SYSTOLIC BLOOD PRESSURE: 126 MMHG

## 2018-11-16 DIAGNOSIS — J01.90 ACUTE BACTERIAL SINUSITIS: Primary | ICD-10-CM

## 2018-11-16 DIAGNOSIS — B96.89 ACUTE BACTERIAL SINUSITIS: Primary | ICD-10-CM

## 2018-11-16 DIAGNOSIS — S76.312A STRAIN OF LEFT HAMSTRING MUSCLE, INITIAL ENCOUNTER: ICD-10-CM

## 2018-11-16 PROCEDURE — G8420 CALC BMI NORM PARAMETERS: HCPCS | Performed by: FAMILY MEDICINE

## 2018-11-16 PROCEDURE — G8428 CUR MEDS NOT DOCUMENT: HCPCS | Performed by: FAMILY MEDICINE

## 2018-11-16 PROCEDURE — G8400 PT W/DXA NO RESULTS DOC: HCPCS | Performed by: FAMILY MEDICINE

## 2018-11-16 PROCEDURE — G8482 FLU IMMUNIZE ORDER/ADMIN: HCPCS | Performed by: FAMILY MEDICINE

## 2018-11-16 PROCEDURE — 3017F COLORECTAL CA SCREEN DOC REV: CPT | Performed by: FAMILY MEDICINE

## 2018-11-16 PROCEDURE — 99214 OFFICE O/P EST MOD 30 MIN: CPT | Performed by: FAMILY MEDICINE

## 2018-11-16 PROCEDURE — 4040F PNEUMOC VAC/ADMIN/RCVD: CPT | Performed by: FAMILY MEDICINE

## 2018-11-16 PROCEDURE — 1036F TOBACCO NON-USER: CPT | Performed by: FAMILY MEDICINE

## 2018-11-16 PROCEDURE — 1090F PRES/ABSN URINE INCON ASSESS: CPT | Performed by: FAMILY MEDICINE

## 2018-11-16 PROCEDURE — 1123F ACP DISCUSS/DSCN MKR DOCD: CPT | Performed by: FAMILY MEDICINE

## 2018-11-16 PROCEDURE — 1101F PT FALLS ASSESS-DOCD LE1/YR: CPT | Performed by: FAMILY MEDICINE

## 2018-11-16 RX ORDER — SULFAMETHOXAZOLE AND TRIMETHOPRIM 800; 160 MG/1; MG/1
1 TABLET ORAL 2 TIMES DAILY
Qty: 20 TABLET | Refills: 0 | Status: SHIPPED | OUTPATIENT
Start: 2018-11-16 | End: 2018-11-26

## 2018-11-16 RX ORDER — BENZONATATE 200 MG/1
200 CAPSULE ORAL 3 TIMES DAILY PRN
Qty: 30 CAPSULE | Refills: 0 | Status: SHIPPED | OUTPATIENT
Start: 2018-11-16 | End: 2018-11-23

## 2018-11-16 RX ORDER — DEXTROMETHORPHAN HYDROBROMIDE AND PROMETHAZINE HYDROCHLORIDE 15; 6.25 MG/5ML; MG/5ML
5 SYRUP ORAL 4 TIMES DAILY PRN
Qty: 240 ML | Refills: 0 | Status: SHIPPED | OUTPATIENT
Start: 2018-11-16 | End: 2019-10-24

## 2018-11-16 NOTE — PROGRESS NOTES
Bunny Amato is a 79y.o. year old female who complains of moderate dry cough, nasal blockage, clear nasal discharge, post nasal drip, sinus and nasal congestion, bilateral sinus pain and sore throat for 7 days. Symptoms show no change over that time. She denies a history of chills, fevers, shortness of breath and chest pain and denies a history of asthma. She has taken nothing for this. Left posterior leg pain for 1 week. Pain is posterior knee and hamstring. Stairs increase pain. Denies injury or new activity. ROS: No TIA's or dysphagia. No prolonged cough. No dyspnea or chest pain on exertion. No abdominal pain, change in bowel habits, black or bloody stools. No urinary tract symptoms. She is post hysterectomy. No abnormal vaginal bleeding, discharge or unexpected pelvic pain. No new breast lumps, breast pain or nipple discharge. Social History   Substance Use Topics    Smoking status: Former Smoker     Packs/day: 1.00     Years: 39.00     Types: Cigarettes     Start date: 1/1/1971     Quit date: 11/1/2010    Smokeless tobacco: Never Used    Alcohol use 0.6 oz/week     1 Standard drinks or equivalent per week      Comment: several times per year        Current Outpatient Prescriptions   Medication Sig Dispense Refill    baclofen (LIORESAL) 10 MG tablet Take 1 tablet by mouth 3 times daily 90 tablet 1    traZODone (DESYREL) 50 MG tablet Take 1 tablet by mouth nightly 30 tablet 5    fluticasone (FLONASE) 50 MCG/ACT nasal spray 2 sprays by Nasal route daily 1 Bottle 5    donepezil (ARICEPT) 10 MG tablet Take 1 tablet by mouth nightly 30 tablet 5    diclofenac (VOLTAREN) 50 MG EC tablet Take 1 tablet by mouth 3 times daily (with meals) For pain 90 tablet 5     No current facility-administered medications for this visit.         Allergies   Allergen Reactions    Codeine           Objective:      /66 (Site: Right Upper Arm, Position: Sitting, Cuff Size: Medium Adult)   Pulse 93

## 2019-02-19 ENCOUNTER — OFFICE VISIT (OUTPATIENT)
Dept: FAMILY MEDICINE CLINIC | Age: 69
End: 2019-02-19
Payer: MEDICARE

## 2019-02-19 VITALS
TEMPERATURE: 96.5 F | OXYGEN SATURATION: 98 % | DIASTOLIC BLOOD PRESSURE: 70 MMHG | BODY MASS INDEX: 22.5 KG/M2 | HEART RATE: 68 BPM | WEIGHT: 123 LBS | SYSTOLIC BLOOD PRESSURE: 118 MMHG

## 2019-02-19 DIAGNOSIS — N30.00 ACUTE CYSTITIS WITHOUT HEMATURIA: ICD-10-CM

## 2019-02-19 DIAGNOSIS — H04.203 WATERY EYES: ICD-10-CM

## 2019-02-19 DIAGNOSIS — R30.0 DYSURIA: Primary | ICD-10-CM

## 2019-02-19 DIAGNOSIS — F03.90 DEMENTIA WITHOUT BEHAVIORAL DISTURBANCE, UNSPECIFIED DEMENTIA TYPE: ICD-10-CM

## 2019-02-19 LAB
BILIRUBIN, POC: NEGATIVE
BLOOD URINE, POC: ABNORMAL
CLARITY, POC: ABNORMAL
COLOR, POC: ABNORMAL
GLUCOSE URINE, POC: NEGATIVE
KETONES, POC: NEGATIVE
LEUKOCYTE EST, POC: ABNORMAL
NITRITE, POC: NEGATIVE
PH, POC: 6
PROTEIN, POC: NEGATIVE
SPECIFIC GRAVITY, POC: 1.02
UROBILINOGEN, POC: 0.2

## 2019-02-19 PROCEDURE — 1090F PRES/ABSN URINE INCON ASSESS: CPT | Performed by: FAMILY MEDICINE

## 2019-02-19 PROCEDURE — 1101F PT FALLS ASSESS-DOCD LE1/YR: CPT | Performed by: FAMILY MEDICINE

## 2019-02-19 PROCEDURE — G8420 CALC BMI NORM PARAMETERS: HCPCS | Performed by: FAMILY MEDICINE

## 2019-02-19 PROCEDURE — 4040F PNEUMOC VAC/ADMIN/RCVD: CPT | Performed by: FAMILY MEDICINE

## 2019-02-19 PROCEDURE — 1036F TOBACCO NON-USER: CPT | Performed by: FAMILY MEDICINE

## 2019-02-19 PROCEDURE — 1123F ACP DISCUSS/DSCN MKR DOCD: CPT | Performed by: FAMILY MEDICINE

## 2019-02-19 PROCEDURE — G8400 PT W/DXA NO RESULTS DOC: HCPCS | Performed by: FAMILY MEDICINE

## 2019-02-19 PROCEDURE — 99214 OFFICE O/P EST MOD 30 MIN: CPT | Performed by: FAMILY MEDICINE

## 2019-02-19 PROCEDURE — G8482 FLU IMMUNIZE ORDER/ADMIN: HCPCS | Performed by: FAMILY MEDICINE

## 2019-02-19 PROCEDURE — 3017F COLORECTAL CA SCREEN DOC REV: CPT | Performed by: FAMILY MEDICINE

## 2019-02-19 PROCEDURE — 81002 URINALYSIS NONAUTO W/O SCOPE: CPT | Performed by: FAMILY MEDICINE

## 2019-02-19 PROCEDURE — G8428 CUR MEDS NOT DOCUMENT: HCPCS | Performed by: FAMILY MEDICINE

## 2019-02-19 RX ORDER — CIPROFLOXACIN 250 MG/1
250 TABLET, FILM COATED ORAL 2 TIMES DAILY
Qty: 10 TABLET | Refills: 0 | Status: SHIPPED | OUTPATIENT
Start: 2019-02-19 | End: 2019-02-24

## 2019-02-19 RX ORDER — MEMANTINE HYDROCHLORIDE 10 MG/1
10 TABLET ORAL 2 TIMES DAILY
Qty: 60 TABLET | Refills: 1 | Status: SHIPPED | OUTPATIENT
Start: 2019-02-19 | End: 2019-10-24

## 2019-02-19 RX ORDER — PHENAZOPYRIDINE HYDROCHLORIDE 200 MG/1
200 TABLET, FILM COATED ORAL 3 TIMES DAILY PRN
Qty: 9 TABLET | Refills: 0 | Status: SHIPPED | OUTPATIENT
Start: 2019-02-19 | End: 2019-02-22

## 2019-02-19 ASSESSMENT — PATIENT HEALTH QUESTIONNAIRE - PHQ9
2. FEELING DOWN, DEPRESSED OR HOPELESS: 0
SUM OF ALL RESPONSES TO PHQ QUESTIONS 1-9: 0
SUM OF ALL RESPONSES TO PHQ9 QUESTIONS 1 & 2: 0
SUM OF ALL RESPONSES TO PHQ QUESTIONS 1-9: 0
1. LITTLE INTEREST OR PLEASURE IN DOING THINGS: 0

## 2019-02-21 LAB
COPY(IES) SENT TO:: NORMAL
CULTURE RESULT: NORMAL
DATE OF FINAL REPORT: NORMAL
Lab: NORMAL
SOURCE: NORMAL

## 2019-05-15 ENCOUNTER — TELEPHONE (OUTPATIENT)
Dept: FAMILY MEDICINE CLINIC | Age: 69
End: 2019-05-15

## 2019-10-24 ENCOUNTER — OFFICE VISIT (OUTPATIENT)
Dept: FAMILY MEDICINE CLINIC | Age: 69
End: 2019-10-24
Payer: MEDICARE

## 2019-10-24 VITALS
WEIGHT: 112.4 LBS | HEART RATE: 89 BPM | BODY MASS INDEX: 20.56 KG/M2 | DIASTOLIC BLOOD PRESSURE: 70 MMHG | TEMPERATURE: 96.9 F | SYSTOLIC BLOOD PRESSURE: 118 MMHG | OXYGEN SATURATION: 97 %

## 2019-10-24 DIAGNOSIS — M54.31 SCIATICA OF RIGHT SIDE: ICD-10-CM

## 2019-10-24 DIAGNOSIS — F03.90 DEMENTIA WITHOUT BEHAVIORAL DISTURBANCE, UNSPECIFIED DEMENTIA TYPE: Primary | ICD-10-CM

## 2019-10-24 DIAGNOSIS — F51.04 PSYCHOPHYSIOLOGICAL INSOMNIA: ICD-10-CM

## 2019-10-24 DIAGNOSIS — R53.82 CHRONIC FATIGUE: ICD-10-CM

## 2019-10-24 PROCEDURE — 90653 IIV ADJUVANT VACCINE IM: CPT | Performed by: FAMILY MEDICINE

## 2019-10-24 PROCEDURE — 99214 OFFICE O/P EST MOD 30 MIN: CPT | Performed by: FAMILY MEDICINE

## 2019-10-24 PROCEDURE — G0008 ADMIN INFLUENZA VIRUS VAC: HCPCS | Performed by: FAMILY MEDICINE

## 2019-10-24 RX ORDER — TRAZODONE HYDROCHLORIDE 50 MG/1
50 TABLET ORAL NIGHTLY
Qty: 30 TABLET | Refills: 5 | Status: SHIPPED | OUTPATIENT
Start: 2019-10-24 | End: 2020-08-17 | Stop reason: SDUPTHER

## 2019-10-24 RX ORDER — RIVASTIGMINE 9.5 MG/24H
1 PATCH, EXTENDED RELEASE TRANSDERMAL DAILY
Qty: 30 PATCH | Refills: 3 | Status: SHIPPED | OUTPATIENT
Start: 2019-10-24 | End: 2020-08-17 | Stop reason: SDUPTHER

## 2019-10-24 ASSESSMENT — PATIENT HEALTH QUESTIONNAIRE - PHQ9
2. FEELING DOWN, DEPRESSED OR HOPELESS: 0
SUM OF ALL RESPONSES TO PHQ QUESTIONS 1-9: 0
1. LITTLE INTEREST OR PLEASURE IN DOING THINGS: 0
SUM OF ALL RESPONSES TO PHQ QUESTIONS 1-9: 0
SUM OF ALL RESPONSES TO PHQ9 QUESTIONS 1 & 2: 0

## 2019-10-31 ENCOUNTER — HOSPITAL ENCOUNTER (OUTPATIENT)
Dept: CT IMAGING | Age: 69
Discharge: HOME | End: 2019-10-31
Payer: MEDICARE

## 2019-10-31 DIAGNOSIS — F03.90 DEMENTIA WITHOUT BEHAVIORAL DISTURBANCE, UNSPECIFIED DEMENTIA TYPE: ICD-10-CM

## 2019-10-31 LAB
GFR AFRICAN AMERICAN: >60 ML/MIN/1.73M2
GFR NON-AFRICAN AMERICAN: >60 ML/MIN/1.73M2
POC CREATININE: 0.8 MG/DL (ref 0.6–1.1)

## 2019-10-31 PROCEDURE — 70460 CT HEAD/BRAIN W/DYE: CPT

## 2019-10-31 PROCEDURE — 6360000004 HC RX CONTRAST MEDICATION: Performed by: FAMILY MEDICINE

## 2019-10-31 RX ADMIN — IOPAMIDOL 75 ML: 755 INJECTION, SOLUTION INTRAVENOUS at 09:11

## 2020-08-17 ENCOUNTER — TELEPHONE (OUTPATIENT)
Dept: FAMILY MEDICINE CLINIC | Age: 70
End: 2020-08-17

## 2020-08-17 RX ORDER — TRAZODONE HYDROCHLORIDE 50 MG/1
50 TABLET ORAL NIGHTLY
Qty: 30 TABLET | Refills: 5 | Status: SHIPPED | OUTPATIENT
Start: 2020-08-17 | End: 2021-06-08

## 2020-08-17 RX ORDER — RIVASTIGMINE 9.5 MG/24H
1 PATCH, EXTENDED RELEASE TRANSDERMAL DAILY
Qty: 30 PATCH | Refills: 3 | Status: SHIPPED | OUTPATIENT
Start: 2020-08-17

## 2020-08-17 NOTE — TELEPHONE ENCOUNTER
Patient's  informed. He stated she needs a refill on the memantine (NAMENDA) 10 MG tablet  or Trazodone. He stated he is not sure which one worked.

## 2020-08-17 NOTE — TELEPHONE ENCOUNTER
Patient's  called stating patient received letter for jury duty. He states patient has dementia and is not able to sit for jury duty. Wanting to know if you could do a letter excusing her.

## 2020-11-17 ENCOUNTER — VIRTUAL VISIT (OUTPATIENT)
Dept: FAMILY MEDICINE CLINIC | Age: 70
End: 2020-11-17
Payer: MEDICARE

## 2020-11-17 PROCEDURE — 3017F COLORECTAL CA SCREEN DOC REV: CPT | Performed by: FAMILY MEDICINE

## 2020-11-17 PROCEDURE — 1090F PRES/ABSN URINE INCON ASSESS: CPT | Performed by: FAMILY MEDICINE

## 2020-11-17 PROCEDURE — 99214 OFFICE O/P EST MOD 30 MIN: CPT | Performed by: FAMILY MEDICINE

## 2020-11-17 PROCEDURE — G8400 PT W/DXA NO RESULTS DOC: HCPCS | Performed by: FAMILY MEDICINE

## 2020-11-17 PROCEDURE — 1123F ACP DISCUSS/DSCN MKR DOCD: CPT | Performed by: FAMILY MEDICINE

## 2020-11-17 PROCEDURE — G8428 CUR MEDS NOT DOCUMENT: HCPCS | Performed by: FAMILY MEDICINE

## 2020-11-17 PROCEDURE — 4040F PNEUMOC VAC/ADMIN/RCVD: CPT | Performed by: FAMILY MEDICINE

## 2020-11-17 RX ORDER — MECLIZINE HYDROCHLORIDE 25 MG/1
25 TABLET ORAL 3 TIMES DAILY PRN
Qty: 90 TABLET | Refills: 0 | Status: SHIPPED | OUTPATIENT
Start: 2020-11-17 | End: 2021-06-08

## 2020-11-17 ASSESSMENT — PATIENT HEALTH QUESTIONNAIRE - PHQ9
1. LITTLE INTEREST OR PLEASURE IN DOING THINGS: 0
2. FEELING DOWN, DEPRESSED OR HOPELESS: 0
SUM OF ALL RESPONSES TO PHQ9 QUESTIONS 1 & 2: 0
SUM OF ALL RESPONSES TO PHQ QUESTIONS 1-9: 0

## 2020-11-17 ASSESSMENT — ENCOUNTER SYMPTOMS
ALLERGIC/IMMUNOLOGIC NEGATIVE: 1
GASTROINTESTINAL NEGATIVE: 1
RESPIRATORY NEGATIVE: 1
EYES NEGATIVE: 1

## 2020-11-17 NOTE — PROGRESS NOTES
2020    TELEHEALTH EVALUATION -- Audio/Visual (During VZA-13 public health emergency)    HPI:    Simon Oseguera (:  1950) has requested an audio/video evaluation for the following concern(s):    Follow-up on ER. Patient was taken to the emergency room on 2024 worsening confusion and low-grade fever. She had fallen twice prior to going to the ER. She did hit her head and may have lost consciousness for a brief period of time. She had a normal chest x-ray, EKG, nonacute head CT and normal cervical spine CT. She had a Covid test which they were called the next day and told was positive. Labs otherwise were essentially normal.  He states since the ER visit, she has had dizziness, loss of appetite. She is also had pressure in her left ear. Review of Systems   Constitutional: Positive for appetite change and fever. HENT: Positive for ear pain. Eyes: Negative. Respiratory: Negative. Cardiovascular: Negative. Gastrointestinal: Negative. Endocrine: Negative. Genitourinary: Negative. Musculoskeletal: Negative. Allergic/Immunologic: Negative. Neurological: Positive for dizziness. Hematological: Negative. Psychiatric/Behavioral: Positive for confusion. Prior to Visit Medications    Medication Sig Taking? Authorizing Provider   rivastigmine (EXELON) 9.5 MG/24HR Place 1 patch onto the skin daily  Jason Mart MD   traZODone (DESYREL) 50 MG tablet Take 1 tablet by mouth nightly  Jason Mart MD       Social History     Tobacco Use    Smoking status: Former Smoker     Packs/day: 1.00     Years: 39.00     Pack years: 39.00     Types: Cigarettes     Start date: 1971     Last attempt to quit: 2010     Years since quitting: 10.0    Smokeless tobacco: Never Used   Substance Use Topics    Alcohol use: Yes     Alcohol/week: 1.0 standard drinks     Types: 1 Standard drinks or equivalent per week     Comment: several times per year    Drug use:  No Allergies   Allergen Reactions    Codeine    ,   Past Medical History:   Diagnosis Date    HTN (hypertension)    ,   Past Surgical History:   Procedure Laterality Date    CHOLECYSTECTOMY      HYSTERECTOMY, VAGINAL      STAPEDES SURGERY Right    ,   Social History     Tobacco Use    Smoking status: Former Smoker     Packs/day: 1.00     Years: 39.00     Pack years: 39.00     Types: Cigarettes     Start date: 1/1/1971     Last attempt to quit: 11/1/2010     Years since quitting: 10.0    Smokeless tobacco: Never Used   Substance Use Topics    Alcohol use: Yes     Alcohol/week: 1.0 standard drinks     Types: 1 Standard drinks or equivalent per week     Comment: several times per year    Drug use: No       PHYSICAL EXAMINATION:  [ INSTRUCTIONS:  \"[x]\" Indicates a positive item  \"[]\" Indicates a negative item  -- DELETE ALL ITEMS NOT EXAMINED]  Vital Signs: (As obtained by patient/caregiver or practitioner observation)    Not available    Constitutional: [x] Appears well-developed and well-nourished [x] No apparent distress      [] Abnormal-   Mental status  [x] Alert and awake  [x] Oriented to person/place/time [x]Able to follow commands      Eyes:  EOM    [x]  Normal  [] Abnormal-  Sclera  [x]  Normal  [] Abnormal -         Discharge [x]  None visible  [] Abnormal -    HENT:   [x] Normocephalic, atraumatic.   [] Abnormal   [x] Mouth/Throat: Mucous membranes are moist.     External Ears [x] Normal  [] Abnormal-     Neck: [x] No visualized mass     Pulmonary/Chest: [x] Respiratory effort normal.  [x] No visualized signs of difficulty breathing or respiratory distress        [] Abnormal-      Musculoskeletal:           [x] Normal range of motion of neck        [] Abnormal-       Neurological:        [x] No Facial Asymmetry (Cranial nerve 7 motor function) (limited exam to video visit)          [x] No gaze palsy        [] Abnormal-         Skin:        [x] No significant exanthematous lesions or discoloration noted on facial skin         [] Abnormal-            Psychiatric:       [x] Normal Affect         [] Abnormal-         ASSESSMENT/PLAN:  1. Vertigo  Increase water intake  - meclizine (ANTIVERT) 25 MG tablet; Take 1 tablet by mouth 3 times daily as needed for Dizziness  Dispense: 90 tablet; Refill: 0    2. COVID-19  Symptomatic care    3. Fall, subsequent encounter  Home safety tips provided    4. Closed head injury, subsequent encounter  Asymptomatic    5. Dementia without behavioral disturbance, unspecified dementia type ([facility] [address]    6. Loss of appetite  Use Ensure or boost to help with caloric intake. Return if symptoms worsen or fail to improve. Taryn Leon is a 78 y.o. female being evaluated by a Virtual Visit (video visit) encounter to address concerns as mentioned above. A caregiver was present when appropriate. Due to this being a TeleHealth encounter (During [facility]- public health emergency), evaluation of the following organ systems was limited: Vitals/Constitutional/EENT/Resp/CV/GI//MS/Neuro/Skin/Heme-Lymph-Imm. Pursuant to the emergency declaration under the [address] [place] [address], [address] [place] [address] authority and the [employer] Act, this Virtual Visit was conducted with patient's (and/or legal guardian's) consent, to reduce the patient's risk of exposure to COVID-19 and provide necessary medical care. The patient (and/or legal guardian) has also been advised to contact this office for worsening conditions or problems, and seek emergency medical treatment and/or call 911 if deemed necessary. Patient identification was verified at the start of the visit: Yes    Total time spent on this encounter: Not billed by time    Services were provided through a video synchronous discussion virtually to substitute for in-person clinic visit. Patient and provider were located at their individual homes.     --Waqar Kern MD on 11/17/2020 at 3:59 PM    An electronic signature was used to authenticate this note.

## 2020-11-17 NOTE — PATIENT INSTRUCTIONS
Patient Education        Dizziness: Care Instructions  Your Care Instructions  Dizziness is the feeling of unsteadiness or fuzziness in your head. It is different than having vertigo, which is a feeling that the room is spinning or that you are moving or falling. It is also different from lightheadedness, which is the feeling that you are about to faint. It can be hard to know what causes dizziness. Some people feel dizzy when they have migraine headaches. Sometimes bouts of flu can make you feel dizzy. Some medical conditions, such as heart problems or high blood pressure, can make you feel dizzy. Many medicines can cause dizziness, including medicines for high blood pressure, pain, or anxiety. If a medicine causes your symptoms, your doctor may recommend that you stop or change the medicine. If it is a problem with your heart, you may need medicine to help your heart work better. If there is no clear reason for your symptoms, your doctor may suggest watching and waiting for a while to see if the dizziness goes away on its own. Follow-up care is a key part of your treatment and safety. Be sure to make and go to all appointments, and call your doctor if you are having problems. It's also a good idea to know your test results and keep a list of the medicines you take. How can you care for yourself at home? · If your doctor recommends or prescribes medicine, take it exactly as directed. Call your doctor if you think you are having a problem with your medicine. · Do not drive while you feel dizzy. · Try to prevent falls. Steps you can take include:  ? Using nonskid mats, adding grab bars near the tub, and using night-lights. ? Clearing your home so that walkways are free of anything you might trip on.  ? Letting family and friends know that you have been feeling dizzy. This will help them know how to help you. When should you call for help? Call 911 anytime you think you may need emergency care.  For example, call if:    · You passed out (lost consciousness).     · You have dizziness along with symptoms of a heart attack. These may include:  ? Chest pain or pressure, or a strange feeling in the chest.  ? Sweating. ? Shortness of breath. ? Nausea or vomiting. ? Pain, pressure, or a strange feeling in the back, neck, jaw, or upper belly or in one or both shoulders or arms. ? Lightheadedness or sudden weakness. ? A fast or irregular heartbeat.     · You have symptoms of a stroke. These may include:  ? Sudden numbness, tingling, weakness, or loss of movement in your face, arm, or leg, especially on only one side of your body. ? Sudden vision changes. ? Sudden trouble speaking. ? Sudden confusion or trouble understanding simple statements. ? Sudden problems with walking or balance. ? A sudden, severe headache that is different from past headaches. Call your doctor now or seek immediate medical care if:    · You feel dizzy and have a fever, headache, or ringing in your ears.     · You have new or increased nausea and vomiting.     · Your dizziness does not go away or comes back. Watch closely for changes in your health, and be sure to contact your doctor if:    · You do not get better as expected. Where can you learn more? Go to https://[employer]. org and sign in to your [employer] account. Enter F692 in the [employer] box to learn more about \"Dizziness: Care Instructions. \"     If you do not have an account, please click on the \"Sign Up Now\" link. Current as of: June 26, 2019               Content Version: 12.6  © 3232-8841 [employer]. Care instructions adapted under license by [facility] ([facility]). If you have questions about a medical condition or this instruction, always ask your healthcare professional. [address] any warranty or liability for your use of this information.

## 2021-01-25 ENCOUNTER — TELEPHONE (OUTPATIENT)
Dept: FAMILY MEDICINE CLINIC | Age: 71
End: 2021-01-25

## 2021-01-25 DIAGNOSIS — Z12.31 BREAST CANCER SCREENING BY MAMMOGRAM: Primary | ICD-10-CM

## 2021-02-10 ENCOUNTER — HOSPITAL ENCOUNTER (OUTPATIENT)
Dept: MAMMOGRAPHY | Age: 71
Discharge: HOME | End: 2021-02-10
Payer: MEDICARE

## 2021-02-10 DIAGNOSIS — Z12.31 BREAST CANCER SCREENING BY MAMMOGRAM: ICD-10-CM

## 2021-02-10 PROCEDURE — 77067 SCR MAMMO BI INCL CAD: CPT

## 2021-03-22 LAB
FOLATE: 7.01 NG/ML (ref 5.9–24.8)
TSH ULTRASENSITIVE: 2.08 UIU/ML (ref 0.45–5.33)
VITAMIN B-12: 166 PG/ML (ref 180–914)
VITAMIN D 25-HYDROXY: 29.3 NG/ML

## 2021-06-08 ENCOUNTER — CARE COORDINATION (OUTPATIENT)
Dept: CARE COORDINATION | Age: 71
End: 2021-06-08

## 2021-06-08 DIAGNOSIS — R29.898 LEFT ARM WEAKNESS: Primary | ICD-10-CM

## 2021-06-08 NOTE — CARE COORDINATION
Call to pt/ for acm outreach from pcp referral, no answer, lmtc. Ambulatory Care Coordination Note  6/8/2021  CM Risk Score: 1  Charlson 10 Year Mortality Risk Score: 23%     ACC: Lurdes Infante RN    Summary Note: Call to pt/, introduced ACM and agreeable to outreach. Spoke with , pt in background. Pt  reports pt's neurologist prescribed 14 therapies and insurance only provided 2 sessions of physical therapy. Alisson Ramos was home care provider, [facility] -provider? [facility]. Last PT session was a month to 6 weeks ago. INterested in out patient or home therapy. Believes pt would most benefit from occupational therapy. No longer taking antivert or trazodone. Advised pt may benefit from Med alert necklace, reports he never got walker delivered, denies need for this now. Pt is physically active walk around [place] farm. Discussed fall prevention as reports had 2 falls in past year.  is requesting OT for pt as L hand/arm issues and arm will draw up to pt neck at times. Does not have preference between [facility]. Provided contact info on area agency on aging, alzheimers association, and [facility] adult day care. Plan: Will f/u with home care/[employer] in regard to Occupational therapy and possible case management thru [facility] Will also refer to  to look into any other possible resources in [facility]. Ambulatory Care Coordination Assessment    Care Coordination Protocol  Program Enrollment: Rising Risk  Referral from Primary Care Provider: No  Week 1 - Initial Assessment     Do you have all of your prescriptions and are they filled?: Yes  Barriers to medication adherence: None  Are you able to afford your medications?: Yes  How often do you have trouble taking your medications the way you have been told to take them?: I always take them as prescribed.      Do you have Home O2 Therapy?: No      Ability to seek help/take action for Emergent Urgent situations i.e. fire, crime, inclement weather or health crisis.: Needs Assistance  Ability to ambulate to restroom: Independent  Ability handle personal hygeine needs (bathing/dressing/grooming): Dependent  Ability to manage Medications: Dependent  Ability to prepare Food Preparation: Dependent  Ability to maintain home (clean home, laundry): Dependent  Ability to drive and/or has transportation: Dependent  Ability to do shopping: Dependent  Ability to manage finances: Dependent  Is patient able to live independently?: No     Current Housing: Private Residence        Per the Fall Risk Screening, did the patient have 2 or more falls or 1 fall with injury in the past year?: Yes  How often do you think you are about to fall and you do NOT fall? For example, you grab something to stabilize yourself or hold onto a wall/furniture?: Occasionally  Use of a Mobility Aid: No  Difficulty walking/impaired gait: No  Issues with feet or shoes like numbness, edema, shoes not fitting: No  Changes in vision, poor vision or poor lighting in environment: No  Dizziness: No  Other Fall Risk: Yes     Frequent urination at night?: No  Do you use rails/bars?: Yes  Do you have a non-slip tub mat?: Yes     Are you experiencing loss of meaning?: No  Are you experiencing loss of hope and peace?: No     Suggested Interventions and Community Resources  Adult Day Program: In Process   Fall Risk Prevention: In Process Occupational Therapy: In Process   Physical Therapy: In Process   Senior Services: In Process   Social Work: In Process                  Prior to Admission medications    Medication Sig Start Date End Date Taking?  Authorizing Provider   meclizine (ANTIVERT) 25 MG tablet Take 1 tablet by mouth 3 times daily as needed for Dizziness 11/17/20   Samson Mcintosh MD   rivastigmine (EXELON) 9.5 MG/24HR Place 1 patch onto the skin daily 8/17/20   Samson Mcintosh MD   traZODone (DESYREL) 50 MG tablet Take 1 tablet by mouth nightly 8/17/20

## 2021-06-10 NOTE — CARE COORDINATION
Call to [facility] care for f/u on previous therapy outcome as pt spouse reports was only able to get 2 of 14 sessions of Physical Therapy, unsure of amt of Occupational Therapy pt recvd. Per ORLÉANS, pt was discharged 5/20 as it was end of episode, pt no longer skilled on 4/30. Reports Insurance denied speech therapy, attempted to set up OT but reports they could not reach patient. Would need additional home care referral if wish to start therapy up again. If decide on home care,  order can be faxed . [phone] intake. Reports pt recvd multiple sesions of physical therapy per Maude. ACM made Call to [facility] to look into outpt OT. Rcvd callback from Mervat Coker, who reports they have hand specialist also. [employer] requires Pre authorization of visits once eval is done. Twice a week typically the schedule. Referral can be faxed to [phone]. Call to pt/ to inform of info re:  OT and to determine where he would like referral  to be sent. NO answer, lmtc.

## 2021-06-11 ENCOUNTER — CARE COORDINATION (OUTPATIENT)
Dept: CARE COORDINATION | Age: 71
End: 2021-06-11

## 2021-06-11 NOTE — CARE COORDINATION
Spoke with patient spouse for follow up. Oriented to ACM; explained purpose for follow up. Identified patient by name and . Instructed on OT referral.  Discussed Outpatient/ HHC options. Patient spouse reports that he would prefer for patient to follow with Outpatient therapy. Agreeable to referral to [facility]. Provided address and contact information. Patient spouse denies transportation barriers. Denies any questions or concerns at this time. ACM contact information provided should questions arise. Fax sent to [facility]  [phone]. Message sent to Shannon Kelley to confirm. Follow up contact from JENNIFER Rosa. Confirmed receipt of order and plan for follow up.

## 2021-06-15 ENCOUNTER — CARE COORDINATION (OUTPATIENT)
Dept: CARE COORDINATION | Age: 71
End: 2021-06-15

## 2021-06-15 NOTE — CARE COORDINATION
Initial Contact Social Work Note - Ambulatory  6/15/2021      Date of referral: 6/8/21  Referral received from: Ta Jung  Reason for referral: F/u on resources    Previous SW referral: No  If yes, brief summary of outcome:     Two Identifiers Verified: Yes    Insurance Provider: [employer]    Support System:  Spouse/Partner    [place] Status: N/A    Community Providers: N/A    ADL Assistance Needed: Bathing and Dressing- spouse assisting at this time    Housing/Living Concerns or Home Modification Needs: None      Transportation Concern: Spouse transports    Medication Cost Concern: None     Medication Adherence Concern: None    Financial Concern(s): None    Income (only if applicable): N/A    Ability to Read/Write: Yes    Advance Care Plan:  N/A    Other:     Identified Needs:   Spouse in process of scheduling outpatient PT      Social Work Plan:   Spouse reported that he had several calls to make and could only focus on one thing at a time. Spouse will call SW with any questions or when assistance is needed. Contact info documented.

## 2021-06-28 ENCOUNTER — HOSPITAL ENCOUNTER (OUTPATIENT)
Dept: OCCUPATIONAL THERAPY | Age: 71
Setting detail: THERAPIES SERIES
Discharge: HOME | End: 2021-06-28
Payer: MEDICARE

## 2021-06-28 PROCEDURE — 97166 OT EVAL MOD COMPLEX 45 MIN: CPT

## 2021-06-28 NOTE — PROGRESS NOTES
Occupational Therapy  Occupational Therapy Initial Assessment  Date:  2021    Patient Name: Jennifer Ramírez  MRN: [id number]     :  1950     Treatment Diagnosis: decreased ROM, weakness, lack of coordination    Restrictions: poor cognition     Subjective   General  Chart Reviewed: Yes  Patient assessed for rehabilitation services?: Yes  Additional Pertinent Hx: dementia, covid, falls  Family / Caregiver Present: Yes  Referring Practitioner: Dr. 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 holds left UE in protective posture flexed and close to body                 Cognition  Overall Cognitive Status: Exceptions  Arousal/Alertness: Appropriate responses to stimuli  Following Commands:  Follows one step commands with repetition  Attention Span: Difficulty attending to directions  Memory: Decreased recall of biographical Information;Decreased recall of recent events;Decreased short term memory  Problem Solving: Assistance required to generate solutions;Assistance required to implement solutions  Initiation: Requires cues for some  Sequencing: Requires cues for all  Perception  Overall Perceptual Status: WFL  Sensation  Overall Sensation Status: WNL           LUE PROM (degrees)  LUE PROM: Exceptions  LUE General PROM: Pt.'s shoulder is contracted at 93 degrees flex;  elbow is contracted at 100 degrees extension but holds arm at 134 degrees elbow flexion; wrist is contracted at 30 degree flex/ext  Left Hand PROM (degrees)  Left Hand PROM: Exceptions  Left Hand General PROM: pt.'s fingers rest in curled position and ring and little finger have flexion contractures  RUE PROM (degrees)  RUE PROM: WNL  RUE AROM (degrees)  RUE AROM : WNL  Right Hand PROM (degrees)  Right Hand PROM: WNL  Right Hand AROM (degrees)  Right Hand AROM: WNL        Left Hand Strength -  (lbs)  Handle Setting 2: 7#  Right Hand Strength -  (lbs)  Handle Setting 2: 26.5#  Hand Assessment Comment  Hand Assessment Comment: attempted box n blocks test but pt. unable to complete due to decreased cognition and command following       Assessment   Assessment  Performance deficits / Impairments: Decreased ADL status; Decreased ROM; Decreased strength;Decreased cognition;Decreased fine motor control  Treatment Diagnosis: decreased ROM, weakness, lack of coordination  Prognosis: Fair  Decision Making: Medium Complexity  History: dementia, covid, falls  Exam: 4 performance deficits  Assistance / Modification: max A for ADL  REQUIRES OT FOLLOW UP: Yes  Treatment Initiated : therapeutic activity to encourage left UE motion       Barriers to learning:  Poor cognition and command following     Plan   Plan  Times per week: 2  Plan weeks: 4  Current Treatment Recommendations: ROM, Patient/Caregiver Education & Training, Strengthening    OutComes Score   dynamometer    Goals  Long term goals  Time Frame for Long term goals : 4 weeeks  Long term goal 1: Pt. will increase left UE AROM and PROM to be able to allow caregiver to more easily dress pt's upper body, and for pt. to be able to use left UE in functional tasks  Patient Goals   Patient goals : pt.'s caregiver goals are for pt. to increase PROM/AROM of left arm for increased ease with ADL and to lessen burden of care       Therapy Time   Individual Concurrent Group Co-treatment   Time In 0930         Time Out 1030         Minutes 60                 Ariana Bhatia, OTR/L        [x][facility] [address]      [facility]     [address] [facility] [address]       [facility] [address], [address], [address]       [address]     [phone]  [phone] [phone]  ________________________________________________________________________       Occupational Therapy Certification/Re-Certification Form  Dear Dr. Jigar Prince  The following patient has been evaluated for occupational therapy services and for therapy to continue, insurance requires physician review of the treatment plan initially and every 90 days. Please review the attached evaluation and/or summary of the patient's plan of care, and verify that you agree therapy should continue by signing the attached document and sending it back to our office.     Plan of Care/Treatment to date:  [x] Therapeutic Exercise   [] Modalities:  [x] Therapeutic Activity    [] Ultrasound [] Elec Stimulation   [] Total Motion Release    [] Fluido [] Kinesiotaping  [] Neuromuscular Re-education   [] Ionto [] Coldpack/hotpack   [x] Instruction in HEP    Other:  [] Manual Therapy     []   [] Aquatic Therapy     []       Frequency/Duration:  # Days per week: [] 1 day # Weeks: [] 1 week [] 5 weeks     [x] 2 days   [] 2 weeks [] 6 weeks     [] 3 days   [] 3 weeks [] 7 weeks     [] 4 days   [x] 4 weeks [] 8 weeks         [] 9 weeks [] 10 weeks         [] 11 weeks [] 12 weeks    Rehab Potential/Progress: [] excellent [] good [x] fair  [] poor       Goals:    Long term goal 1: Pt. will increase left UE AROM and PROM to be able to allow caregiver to more easily dress pt's upper body, and for pt. to be able to use left UE in functional tasks    Electronically signed by:  eDbra Vega OT,OTR/L, 6/28/2021, 6:47 PM    If you have any questions or concerns, please don't hesitate to call.   Thank you for your referral.      Physician Signature:__________________   Date:_____________ Time: _______  By signing above, therapists plan is approved by physician

## 2021-06-29 NOTE — FLOWSHEET NOTE
Patients Plan of Care was received and signed. Signed POC was scanned and placed in the patients chart.     Ninetta Carrel

## 2021-07-09 ENCOUNTER — CARE COORDINATION (OUTPATIENT)
Dept: CARE COORDINATION | Age: 71
End: 2021-07-09

## 2021-07-09 ENCOUNTER — HOSPITAL ENCOUNTER (OUTPATIENT)
Dept: OCCUPATIONAL THERAPY | Age: 71
Setting detail: THERAPIES SERIES
Discharge: HOME | End: 2021-07-09
Payer: MEDICARE

## 2021-07-09 PROCEDURE — 97112 NEUROMUSCULAR REEDUCATION: CPT

## 2021-07-09 PROCEDURE — 97110 THERAPEUTIC EXERCISES: CPT

## 2021-07-09 PROCEDURE — 97530 THERAPEUTIC ACTIVITIES: CPT

## 2021-07-09 NOTE — CARE COORDINATION
Ambulatory Care Coordination Note  7/9/2021  CM Risk Score: 1  Charlson 10 Year Mortality Risk Score: 23%     ACC: Misael Bryan RN    Summary Note: Spoke with pt . Expressed frustration that insurance will only cover 4 therapy sessions. States they go for the second one today. States he tries to engage pt with exercise at home but pt does not willingly want to do therapy exercises with spouse.  expressed concern for need for some respite care. 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 He also has errands he must do periodically that pt cannot attend. Care Coordination Interventions    Program Enrollment: Rising Risk  Referral from Primary Care Provider: No  Suggested Interventions and Community Resources  Adult Day Program: In Process  Fall Risk Prevention: In Process  Occupational Therapy: In Process  Physical Therapy: In Process  Senior Services: In Process  Social Work: In Process         Goals Addressed    None         Prior to Admission medications    Medication Sig Start Date End Date Taking?  Authorizing Provider   rivastigmine (EXELON) 9.5 MG/24HR Place 1 patch onto the skin daily 8/17/20   Eva Lane MD       Future Appointments   Date Time Provider [place]   7/9/2021  3:15 PM Ariana Andrade OT [address] [facility] OT [facility]   7/16/2021 11:15 AM Ariana Andrade OT [address] [facility] OT [facility]   7/23/2021  9:00 AM Rosamaria Frederick OT [address] [facility] SUSHANT [facility]   7/30/2021  9:00 AM Rosamaria Frederick OT [address] [facility] OT [facility]       Plan     Respite provider list  F/U 1 week

## 2021-07-09 NOTE — FLOWSHEET NOTE
Occupational Therapy Out Patient Daily Treatment Note     [facility]      KIRK RUSSO [facility] [address]       [facility] [address], [address], [address]       [address]     [phone]  [phone] [phone]  ______________________________________________________________________  Date:  2021  Patient Name:  Simon Oseguera    :  1950  Restrictions/Precautions:  Dementia; difficulty following instructions  Diagnosis:     Treatment Diagnosis:  L UE weakness  Insurance/Certification information:   [employer]    Approved for 4 visits through 21  Referring Physician:   Dr. Acharya Shallow of [facility] signed (Y/N):    Visit# / total visits:  approved  COVID screening questions were asked and patient attested that there had been no contact or symptoms  Pain level: pt. Unable to rate pain due to dementia but says it hurts when left hand is stretched     Subjective: pt's  states that pt. Will be scratching her own neck with her left hand and cannot figure out what is happening  Prior Level of Function:  Has been declining  Patient Goals: move left arm better    Treatment Flowsheet   Right Left            Restrained pt's R hand by holding hands with therapist.  Pt. Performed various reaching and grasp/release tasks with left hand with maximal verbal cuing to open hand and grasp items and place in box or if a ring, place on vertical post.  Pt. Is unable to fully extend elbow or last 3 digits and has limited shoulder flexion.               Plan to look into splint for left hand to prevent further contracture                                                                                                                Interventions/Modalities used:  [x] Therapeutic Exercise   [] Modalities:  [x] Therapeutic Activity    [] Ultrasound [] Elec Stimulation   [] Total Motion Release    [] Fluido [] Kinesiotaping  [] Neuromuscular Re-education   [] Ionto [] Coldpack/hotpack   [] Instruction in HEP    Other:    Objective Findings:    Communication with other providers:    Education provided to patient: suggested activities to  to encourage pt.  To use left arm    Adverse Reactions to treatment:  none    Time in:  315  Time out:  400  Timed treatment minutes: 45  Total treatment time:  45     If BWC Please Indicate Time In/Out  CPT Code Time In Time Out Total Min                                                                    Treatment/Activity Tolerance:     []  Patient tolerated treatment well []  Patient limited by fatique    [x]  Patient limited by pain []  Patient limited by other medical complications   []  Other:     Goals:    Long term goal 1: Pt. will increase left UE AROM and PROM to be able to allow caregiver to more easily dress pt's upper body, and for pt. to be able to use left UE in functional tasks    Patient Requires Follow-up:  [x]  Yes  []  No    Plan: [x]  Continue per plan of care []  Alter current plan (see comments)   []  Plan of care initiated []  Hold pending MD visit []  Discharge    Plan for Next Session:      Electronically signed by:  Brian Bryan OT,OTR/L, 7/9/2021, 4:44 PM

## 2021-07-14 ENCOUNTER — CARE COORDINATION (OUTPATIENT)
Dept: CARE COORDINATION | Age: 71
End: 2021-07-14

## 2021-07-16 ENCOUNTER — HOSPITAL ENCOUNTER (OUTPATIENT)
Dept: OCCUPATIONAL THERAPY | Age: 71
Setting detail: THERAPIES SERIES
Discharge: HOME | End: 2021-07-16
Payer: MEDICARE

## 2021-07-16 PROCEDURE — 97112 NEUROMUSCULAR REEDUCATION: CPT

## 2021-07-16 PROCEDURE — 97530 THERAPEUTIC ACTIVITIES: CPT

## 2021-07-16 PROCEDURE — L3906 WHO W/O JOINTS CF: HCPCS

## 2021-07-23 ENCOUNTER — HOSPITAL ENCOUNTER (OUTPATIENT)
Dept: OCCUPATIONAL THERAPY | Age: 71
Setting detail: THERAPIES SERIES
Discharge: HOME | End: 2021-07-23
Payer: MEDICARE

## 2021-07-23 PROCEDURE — 97530 THERAPEUTIC ACTIVITIES: CPT

## 2021-07-23 PROCEDURE — 97110 THERAPEUTIC EXERCISES: CPT

## 2021-07-23 NOTE — FLOWSHEET NOTE
Occupational Therapy Out Patient Daily Treatment Note     [][address]      KIRK RUSSO [facility] [address]       [facility] [address], [address]       [address]     [phone]  [phone]  ______________________________________________________________________  Date:  2021  Patient Name:  Rangel Cortez    :  1950  Restrictions/Precautions:  Dementia; difficulty following instructions  Diagnosis:     Treatment Diagnosis:  L UE weakness  Insurance/Certification information:   [employer]    Approved for 4 visits through 21  Referring Physician:   Dr. Thomas Part of care signed (Y/N):    Visit# / total visits: 3/4 approved  COVID screening questions were asked and patient attested that there had been no contact or symptoms  Pain level: pt. Unable to rate pain due to dementia but says it hurts when left hand is stretched     Subjective: pt's  states she is tolerating splints well   Prior Level of Function:  Has been declining  Patient Goals: move left arm better    Treatment Flowsheet   Right Left          Pt. Performed grasp/release and reaching task with left UE with max encouragement to use left hand and not right hand. Pt. Also needs cues to open hand and constant redirection to stay on task. Pt. Seemed to be using left arm a little more readily today. x        Applied pt's elbow and hand splint at end of treatment. x                  Considering air splint for left elbow.                                                                               Interventions/Modalities used:  [x] Therapeutic Exercise   [] Modalities:  [x] Therapeutic Activity    [] Ultrasound [] Elec Stimulation   [] Total Motion Release    [] Fluido [] Kinesiotaping  [] Neuromuscular Re-education   [] Ionto [] Coldpack/hotpack   [] Instruction in HEP    Other:    Objective Findings:    Communication with other providers:    Education provided to patient: suggested activities to  to encourage pt.  To use left arm    Adverse Reactions to treatment:  none    Time in: 900  Time out:945  Timed treatment minutes: 45  Total treatment time:  45     If BWC Please Indicate Time In/Out  CPT Code Time In Time Out Total Min                                                                    Treatment/Activity Tolerance:     []  Patient tolerated treatment well []  Patient limited by fatique    [x]  Patient limited by pain []  Patient limited by other medical complications   [x]  Other: limited by decreased cognition    Goals:    Long term goal 1: Pt. will increase left UE AROM and PROM to be able to allow caregiver to more easily dress pt's upper body, and for pt. to be able to use left UE in functional tasks    Patient Requires Follow-up:  [x]  Yes  []  No    Plan: [x]  Continue per plan of care []  Alter current plan (see comments)   []  Plan of care initiated []  Hold pending MD visit []  Discharge    Plan for Next Session:      Electronically signed by:  Sheri Matt OT,OTR/L, 7/23/2021, 10:58 AM

## 2021-07-30 ENCOUNTER — HOSPITAL ENCOUNTER (OUTPATIENT)
Dept: OCCUPATIONAL THERAPY | Age: 71
Setting detail: THERAPIES SERIES
Discharge: HOME | End: 2021-07-30
Payer: MEDICARE

## 2021-07-30 PROCEDURE — 97110 THERAPEUTIC EXERCISES: CPT

## 2021-07-30 PROCEDURE — L3906 WHO W/O JOINTS CF: HCPCS

## 2021-07-30 PROCEDURE — 97530 THERAPEUTIC ACTIVITIES: CPT

## 2021-07-30 NOTE — DISCHARGE SUMMARY
Occupational Therapy Out Patient Daily Treatment Note   And Discharge Summary    [x][facility]      KIRK RUSSO [address]       [facility], [address], [address]       Patrice Bernard [address]     [phone]  [phone] [phone]  ______________________________________________________________________  Date:  2021  Patient Name:  Mahesh Bajwa    :  1950  Restrictions/Precautions:  Dementia; difficulty following instructions  Diagnosis:     Treatment Diagnosis:  L UE weakness  Insurance/Certification information:   [employer]    Approved for 4 visits through 21  Referring Physician:   Dr. Aamir Cazares of care signed (Y/N):    Visit# / total visits:  approved  COVID screening questions were asked and patient attested that there had been no contact or symptoms  Pain level: pt. Unable to rate pain due to dementia but says it hurts when left hand is stretched     Subjective: pt's  states pt. Figured out how to get splints off and a few days ago refused to put them back on  Prior Level of Function:  Has been declining  Patient Goals: move left arm better    Treatment Flowsheet   Right Left          Pt. Performed grasp/release and reaching task with left UE with max encouragement to use left hand and not right hand. Pt. Also needs cues to open hand and constant redirection to stay on task. Pt.'s hand did not seem to be opening as easily today.  x        Issued pt. An elbow extension brace. Instructed pt's  in wear and care of splint. x             Still waiting on hand splint to come in. Will contact pt's  when splint arrives.                                                                                    Interventions/Modalities used:  [x] Therapeutic Exercise   [] Modalities:  [x] Therapeutic Activity    [] Ultrasound [] Elec Stimulation   [] Total Motion Release    [] Fluido [] Kinesiotaping  [] Neuromuscular Re-education   [] Ionto [] Coldpack/hotpack   [] Instruction in HEP    Other:    Objective Findings: L shoulder flex 105;  Elbow extension 105; Wrist flex/ext  50    Communication with other providers:    Education provided to patient: suggested activities to  to encourage pt. To use left arm; suggested  encourage pt. To continue to use R arm/hand as much as possible to maintain function    Adverse Reactions to treatment:  none    Time in: 900  Time out:945  Timed treatment minutes: 45  Total treatment time:  45     If BWC Please Indicate Time In/Out  CPT Code Time In Time Out Total Min                                                                    Treatment/Activity Tolerance:     []  Patient tolerated treatment well []  Patient limited by fatique    [x]  Patient limited by pain []  Patient limited by other medical complications   [x]  Other: limited by decreased cognition    Goals:    Long term goal 1: Pt. will increase left UE AROM and PROM to be able to allow caregiver to more easily dress pt's upper body, and for pt. to be able to use left UE in functional tasks; partially met. Pt's PROM has increased slightly.   Splinting has been recommended and set up for pt. / instructions given to pt's  for positioning with splints to prevent further contracture    Patient Requires Follow-up:  []  Yes  [x]  No    Plan: []  Continue per plan of care []  Alter current plan (see comments)   []  Plan of care initiated []  Hold pending MD visit [x]  Discharge    Plan for Next Session:      Electronically signed by:  Claus Meyer OT,ARIEL/L, 7/30/2021, 11:39 AM

## 2021-08-04 ENCOUNTER — CARE COORDINATION (OUTPATIENT)
Dept: CARE COORDINATION | Age: 71
End: 2021-08-04

## 2021-08-04 NOTE — CARE COORDINATION
Attempted to reach patient for ACM follow up. No answer to phone. Mailbox is full. Will try again at a later date.

## 2021-09-08 ENCOUNTER — CARE COORDINATION (OUTPATIENT)
Dept: CARE COORDINATION | Age: 71
End: 2021-09-08

## 2021-09-08 NOTE — CARE COORDINATION
Ambulatory Care Coordination Note  9/8/2021  CM Risk Score: 1  Charlson 10 Year Mortality Risk Score: 23%     ACC: Tomy Zacarias, RN    Summary Note: Spoke with pt . He is very frustrated with lack of ability to get assistance. [address] has been contacted, [facility], [employer], [place] have all been in contact with spouse. On list for [facility] to try to get help but no assistance available yet. Spouse says family is not able to help. [facility] can do 4 hour blocks of respite minimum when spouse can afford it. Spouse says he was told to change to medicaid and put her in a home. Spouse says pt is doing ok otherwise. Denies other concerns except wanting to try to get some help for pt. Care Coordination Interventions    Program Enrollment: Rising Risk  Referral from Primary Care Provider: No  Suggested Interventions and Community Resources  Adult Day Program: In Process  Fall Risk Prevention: Not Started  Occupational Therapy: Completed (Comment: Visits complete)  Physical Therapy: Completed (Comment: Visits complete)  Senior Services: Completed (Comment: [facility] seen)  Social Work: In Process  Zone Management Tools: Declined         Goals Addressed    None         Prior to Admission medications    Medication Sig Start Date End Date Taking? Authorizing Provider   rivastigmine (EXELON) 9.5 MG/24HR Place 1 patch onto the skin daily 8/17/20   Katrinka Frankel, MD       No future appointments.    and   General Assessment    Do you have any symptoms that are causing concern?: No

## 2021-09-22 ENCOUNTER — CARE COORDINATION (OUTPATIENT)
Dept: CARE COORDINATION | Age: 71
End: 2021-09-22

## 2021-09-22 NOTE — CARE COORDINATION
Ambulatory Care Coordination Note  9/22/2021  CM Risk Score: 1  Charlson 10 Year Mortality Risk Score: 23%     ACC: Kala Muñiz, RN    Summary Note: Spoke with , Alejandrina Turner. Introduced self/role and informed him I would be following up since former ACM was no longer in role. Discussed barriers to care which for the most part are getting services in the home. Other ACM placed referrals with local agencies and he has been placed on waiting list for some and cannot afford the private pay options. He states he has been having barriers getting insurance company to cover things including [facility] support and therapy which they only approved 4 sessions. I informed  I would complete an SBAR and try to make contact with a Human  to see if they can assist with finding support for Jose Armando Landers. There are many programs in [place] that may be able to assist her including [facility] and [facility] from insurance. They have had all follow up appts completed including neurology at [facility]. Was very thankful for follow up. Plan  -complete SBAR  -make contact with insurance  to provide support   -LISW referral not placed at this time as SW was involved from [facility]      Care Coordination Interventions    Program Enrollment: Rising Risk  Referral from Primary Care Provider: No  Suggested Interventions and Community Resources  Adult Day Program: In Process  Fall Risk Prevention: Not Started  Occupational Therapy: Completed (Comment: Visits complete)  Physical Therapy: Completed (Comment: Visits complete)  Senior Services: Completed (Comment: [address] seen)  Social Work: In Process  Zone Management Tools: Declined         Goals Addressed    None         Prior to Admission medications    Medication Sig Start Date End Date Taking?  Authorizing Provider   rivastigmine (EXELON) 9.5 MG/24HR Place 1 patch onto the skin daily 8/17/20   Rosette Mayo MD       No future appointments.

## 2021-09-24 ENCOUNTER — CARE COORDINATION (OUTPATIENT)
Dept: CARE COORDINATION | Age: 71
End: 2021-09-24

## 2021-09-24 NOTE — CARE COORDINATION
Spoke with Shawanda 7615, [address] [facility]. She states he would reach out to her . Will [facility] palliative [facility] [address]. Will continue to follow up.

## 2021-09-29 ENCOUNTER — CARE COORDINATION (OUTPATIENT)
Dept: CARE COORDINATION | Age: 71
End: 2021-09-29

## 2021-09-30 ENCOUNTER — TELEPHONE (OUTPATIENT)
Dept: FAMILY MEDICINE CLINIC | Age: 71
End: 2021-09-30

## 2021-10-04 ENCOUNTER — CARE COORDINATION (OUTPATIENT)
Dept: CARE COORDINATION | Age: 71
End: 2021-10-04

## 2021-10-04 NOTE — CARE COORDINATION
Left message with Silas Duncan from [facility] to return phone call. Will try back at another time.

## 2021-10-11 ENCOUNTER — CARE COORDINATION (OUTPATIENT)
Dept: CARE COORDINATION | Age: 71
End: 2021-10-11

## 2021-10-11 ENCOUNTER — TELEPHONE (OUTPATIENT)
Dept: FAMILY MEDICINE CLINIC | Age: 71
End: 2021-10-11

## 2021-10-11 NOTE — TELEPHONE ENCOUNTER
Sade a care coordinator nurse in [facility] called stating she is helping patient's  set up Palliative care with [facility]. She stated they are requesting demographics, insurance card and last progress note faxed to [phone].

## 2021-10-11 NOTE — CARE COORDINATION
Ambulatory Care Coordination Note  10/11/2021  CM Risk Score: 1  Charlson 10 Year Mortality Risk Score: 23%     ACC: Marian Jackson, FILOMENA    Summary Note: Spoke with Fco Santos. States he has not been able to make contact with Bayron Todd from [facility]. I informed him I was going to assist with placing referral with [facility] palliative care through [facility] to see if she would qualify. [facility] states they need the following information faxed over to fax number [phone]. They need for following information faxed over to them. Demo sheet   Med list   Recent progress notes    I have a message into his PCP office to ask for their support faxing over this information. PCP office called back and I asked them to fax over information requested to [facility] informed of above and that he should receive word from [address] [place] [address] within 72 hours. Care Coordination Interventions    Program Enrollment: Rising Risk  Referral from Primary Care Provider: No  Suggested Interventions and Community Resources  Adult Day Program: In Process  Fall Risk Prevention: Not Started  Occupational Therapy: Completed (Comment: Visits complete)  Physical Therapy: Completed (Comment: Visits complete)  Senior Services: Completed (Comment: [facility] seen)  Social Work: In Process  Zone Management Tools: Declined         Goals Addressed    None         Prior to Admission medications    Medication Sig Start Date End Date Taking? Authorizing Provider   rivastigmine (EXELON) 9.5 MG/24HR Place 1 patch onto the skin daily 8/17/20   Yakov Bahena MD       No future appointments.

## 2021-10-25 ENCOUNTER — CARE COORDINATION (OUTPATIENT)
Dept: CARE COORDINATION | Age: 71
End: 2021-10-25

## 2021-10-25 NOTE — CARE COORDINATION
Ambulatory Care Coordination Note  10/25/2021  CM Risk Score: 1  Charlson 10 Year Mortality Risk Score: 23%     ACC: Rangel Jaquez, RN    Summary Note: Received a missed message from Rita Apgar with [facility]. She continues to be in contact with Curtis to provide support from insurance side. I called and left message with Ryderjose armando Arie with detailed information informing him that I would be discharging Lesli Winter from program due to all qualified services being in place. I also left message with Rita Apgar from [address] [place] [address] as well. Left my contact information with Curtis to call me with any questions or barriers. [facility] referral has been placed and information has been sent from PCP office. Care Coordination Interventions    Program Enrollment: Rising Risk  Referral from Primary Care Provider: No  Suggested Interventions and Community Resources  Adult Day Program: In Process  Fall Risk Prevention: Not Started  Occupational Therapy: Completed (Comment: Visits complete)  Physical Therapy: Completed (Comment: Visits complete)  Senior Services: Completed (Comment: [facility] Services seen)  Social Work: In Process  Zone Management Tools: Declined         Goals Addressed    None         Prior to Admission medications    Medication Sig Start Date End Date Taking? Authorizing Provider   rivastigmine (EXELON) 9.5 MG/24HR Place 1 patch onto the skin daily 20   Wendi Cervantes MD       No future appointments.